# Patient Record
Sex: MALE | Race: WHITE | NOT HISPANIC OR LATINO | Employment: OTHER | URBAN - METROPOLITAN AREA
[De-identification: names, ages, dates, MRNs, and addresses within clinical notes are randomized per-mention and may not be internally consistent; named-entity substitution may affect disease eponyms.]

---

## 2017-06-27 ENCOUNTER — TRANSFERRED RECORDS (OUTPATIENT)
Dept: HEALTH INFORMATION MANAGEMENT | Facility: CLINIC | Age: 82
End: 2017-06-27

## 2017-07-11 ENCOUNTER — TRANSFERRED RECORDS (OUTPATIENT)
Dept: HEALTH INFORMATION MANAGEMENT | Facility: CLINIC | Age: 82
End: 2017-07-11

## 2017-08-31 ENCOUNTER — TRANSFERRED RECORDS (OUTPATIENT)
Dept: HEALTH INFORMATION MANAGEMENT | Facility: CLINIC | Age: 82
End: 2017-08-31

## 2017-09-10 ENCOUNTER — HOME CARE/HOSPICE - HEALTHEAST (OUTPATIENT)
Dept: HOME HEALTH SERVICES | Facility: HOME HEALTH | Age: 82
End: 2017-09-10

## 2017-09-12 ENCOUNTER — PRE VISIT (OUTPATIENT)
Dept: ORTHOPEDICS | Facility: CLINIC | Age: 82
End: 2017-09-12

## 2017-09-12 NOTE — TELEPHONE ENCOUNTER
1.  Date/reason for appt:  9/15/17   DDD/Spinal Stenosis/Tingling Hips to Legs    2.  Referring provider:  Self    3.  Call to patient (Yes / No - short description):  No tranferred from [facility]    Previously followed by Dr [facility]    [facility]    [facility]    No surgery; Injections, PT and imaging  Emailed BONNIE forms to [email or web address]

## 2017-09-13 NOTE — TELEPHONE ENCOUNTER
Received signed BONNIE forms and faxed with cover sheets to [facility], [facility] and [facility] for records/imaging asap

## 2017-09-14 ENCOUNTER — HOME CARE/HOSPICE - HEALTHEAST (OUTPATIENT)
Dept: HOME HEALTH SERVICES | Facility: HOME HEALTH | Age: 82
End: 2017-09-14

## 2017-09-14 NOTE — TELEPHONE ENCOUNTER
Records received from [facility].   Included  Office notes: 2017, 2012, 2011  Radiology reports: MRI right hip 6/27/17(duplicate)  MRI right femur 8/31/17(duplicate)  MRI lumbar 8/31/17(duplicate)  Right hip injection 7/11/17(duplicate)  MRI lumbar 12/8/11- [facility]   Injections: epidural steroid injection 12/30/11- [facility] [place]    Missing/needed records: xray's, [facility] with Dr[facility] Chepe Starr

## 2017-09-14 NOTE — TELEPHONE ENCOUNTER
Radiology reports received from [facility]. Notified Dank[facility] to pull images.   MRI lumbar 12/8/11, 8/31/17  Injection right hip 7/11/17  MRI right hip 6/27/17  MRI right thigh 8/31/17

## 2017-09-14 NOTE — PROGRESS NOTES
Spine Surgery Consultation    REFERRING PHYSICIAN: Referred Self   PRIMARY CARE PHYSICIAN: Abdulkadir Starr           Chief Complaint:   Back Pain (low back pain radiating to right lower extremity)      History of Present Illness:  Symptom Profile Including: location of symptoms, onset, severity, exacerbating/alleviating factors, previous treatments:        Hany Santillan is a 89 year old male who  presents for evaluation of bilateral claudicatory leg pain, right worse than left, which is worse walking around, somewhat improvement seated although he does get symptoms when seated. It radiates down the right lateral leg and an primarily an L5 distribution. He was diagnosed with severe stenosis and has seen multiple surgeons over the last several years. Other doctors have talked with him about decompression and fusion type operations but thought that he was too old and 2 comorbid Surgery. Thus he is kind of been limping along with this. He is previously had lumbar epidurals, most recently a right sided L5-S1 transforaminal injection which was done just a week ago. This most recent injection had no immediate relief. They do think over the last day or 2 he might have been feeling a little bit better. He's also tried Tylenol anti-inflammatories he's been through multiple rounds of physical therapy. He has to bend forward when he walks and he uses a wheeled walker.         Past Medical History:     Past Medical History:   Diagnosis Date     Arthritis      Cancer (H) 2010    Prostate cancer, s/p brachy implant 5-2010 Dr. Li/ [place]     Diabetes (H)      Eczema      Hypertension      Kidney disease      Lupus (H)      Psoriasis      Thyroid disease      Uncomplicated asthma             Past Surgical History:     Past Surgical History:   Procedure Laterality Date     APPENDECTOMY  1936     C STOMACH SURGERY PROCEDURE UNLISTED       CARDIAC SURGERY  1999    Left fem pop     ENT SURGERY  1933    Tonsillectomy     HC  "VASCULAR SURGERY PROCEDURE UNLIST       KNEE SURGERY       NECK SURGERY       ORTHOPEDIC SURGERY      right knee work in 1950, followed by replacement 2006            Social History:     Social History   Substance Use Topics     Smoking status: Former Smoker     Packs/day: 1.00     Years: 40.00     Types: Cigarettes     Smokeless tobacco: Never Used     Alcohol use No            Family History:     Family History   Problem Relation Age of Onset     CANCER Daughter 43     pancreatic     CANCER Daughter      DIABETES Mother      Hyperlipidemia Mother      Hypertension Brother      Coronary Artery Disease Brother             Allergies:     Allergies   Allergen Reactions     Ace Inhibitors      Penicillins             Medications:     Current Outpatient Prescriptions   Medication     gabapentin (NEURONTIN) 100 MG capsule     Methocarbamol (ROBAXIN PO)     lidocaine (LIDODERM) 5 % Patch     capsaicin (ZOSTRIX) 0.025 % CREA cream     ACETAMINOPHEN PO     polyethylene glycol (MIRALAX/GLYCOLAX) powder     niacin (NIASPAN) 1000 MG CR tablet     candesartan (ATACAND) 16 MG tablet     Warfarin Sodium (COUMADIN PO)     glimepiride (AMARYL) 2 MG tablet     Cholecalciferol (VITAMIN D3 PO)     calcium-vitamin D (CALTRATE) 600-400 MG-UNIT per tablet     Coenzyme Q10 (COQ10 PO)     ranitidine (ZANTAC) 150 MG tablet     aspirin 81 MG tablet     febuxostat (ULORIC) 40 MG TABS     No current facility-administered medications for this visit.              Review of Systems:     A 10 point ROS was performed and reviewed. Specific responses to these questions are noted at the end of the document.         Physical Exam:   Vitals: Ht 1.651 m (5' 5\")  Wt 73.3 kg (161 lb 9.6 oz)  BMI 26.89 kg/m2  Constitutional: awake, alert, cooperative, no apparent distress, appears stated age.    Eyes: The sclera are white.  Ears, Nose, Throat: The trachea is midline.  Psychiatric: The patient has a normal affect.  Respiratory: breathing " non-labored  Cardiovascular: The extremities are warm and perfused.  Skin: no obvious rashes or lesions.  Musculoskeletal, Neurologic, and Spine:   The bilateral legs are examined. During the time of my examination he is having severe radicular symptoms and less pain somewhat limits his ability to comply. He is 4-5 on the right hip flexor, 5 out of 5 in the left. 5 out of 5 in the bilateral hamstrings. 5 out of 5 in bilateral quadriceps and tibialis anterior. 3/5 and right extensor hallux longus and 4-5 and the left. Peroneal's are 5 out of 5 bilateral. He has some hyperesthesia in the right L5 distribution. Mildly positive right straight leg raise. His bilateral hips are rigid to the log roll but do not reproduce his symptoms.    He stands with a positive forward sagittal balance leaning over his walker.         Imaging:   We ordered and independently reviewed new radiographs at this clinic visit. The results were discussed with the patient.  Findings include:    8/31/2017 Lumbar MRI: Severe multi-level stenosis, worst at L3-4, L4-5, and L5-S1 where there is both severe central and lateral recess stenosis.   Severe multi-level foraminal stenosis, particularly at on the right side at 3-4, 4-5, and 5-1.  I did compare this against his 12/8/11 MRI and both the central and foraminal stenosis is worst from L3-S1 as compared to these prior films.    6/27/2017:  MRI of the R Hip, does not appear to show severe OA.    7/11/2017: Hip injection with appropriately placed injection.    9/8/17: R L5-S1 TFESI with appropriately placed injection    New AP lateral flexion-extension lumbar radiographs ordered and reviewed today. These show severe spondylosis with a degenerative scoliosis and asymmetric disc space collapse with lateral listhesis at multiple levels.           Assessment and Plan:   Assessment:  89 year old male with degenerative scoliosis and claudicatory right leg pain worse than left leg pain.     Plan:  1. We had  a long discussion with the patient risks benefits and options. He does have disease across multiple levels, but I think L4 5 is the worst by far. This also does correspond with his clinical symptoms. Thus if he were to have a surgery I would recommend a 4-5 decompression.  2. Given his age and comorbidities I do not think he should undergo a multilevel fusion surgery.  3. I think the technical aspects of an L4 5 decompression are very feasible and we discussed the risks of this including small risk of infection and small risk of dural tear and small risk of wound healing problems.  4. I think the major risks for him are the risk that this would not produce complete symptom relief given that he has disease across multiple levels. There is also substantial anesthetic risk given his age, including the risk of stroke heart attack death and postoperative delirium or confusion.  5. His family is very concerned about postoperative delirium because he has a brother who had a surgery and subsequently had significant cognitive decline  6. I'm going to arrange a visit for him with the anesthesia service so that he can learn more about the anesthetic risks and postoperative delirium.  7. I will try to see him back in clinic on the same day to answer any further questions that could arise about surgery.  8. In the meantime the family has a visit scheduled on Monday with a pain physician and I think it is reasonable to seek a second opinion about other forms of nonoperative care.      Respectfully,  Declan Garcias MD  Spine Surgery  [facility]      Answers for HPI/ROS submitted by the patient on 9/15/2017   General Symptoms: Yes  Skin Symptoms: No  HENT Symptoms: Yes  EYE SYMPTOMS: Yes  HEART SYMPTOMS: No  LUNG SYMPTOMS: No  INTESTINAL SYMPTOMS: No  URINARY SYMPTOMS: No  REPRODUCTIVE SYMPTOMS: No  SKELETAL SYMPTOMS: Yes  BLOOD SYMPTOMS: Yes  NERVOUS SYSTEM SYMPTOMS: Yes  MENTAL HEALTH SYMPTOMS: No  Fever:  No  Loss of appetite: No  Weight loss: No  Weight gain: No  Fatigue: Yes  Night sweats: No  Chills: Yes  Increased stress: Yes  Excessive hunger: No  Excessive thirst: No  Feeling hot or cold when others believe the temperature is normal: Yes  Loss of height: Yes  Post-operative complications: No  Surgical site pain: No  Hallucinations: No  Change in or Loss of Energy: Yes  Hyperactivity: No  Confusion: No  Ear pain: No  Ear discharge: No  Hearing loss: No  Tinnitus: No  Nosebleeds: No  Congestion: No  Sinus pain: No  Trouble swallowing: No   Voice hoarseness: Yes  Mouth sores: No  Sore throat: No  Tooth pain: No  Gum tenderness: No  Bleeding gums: No  Change in taste: No  Change in sense of smell: No  Dry mouth: Yes  Hearing aid used: Yes  Neck lump: No  Eye pain: No  Vision loss: No  Dry eyes: Yes  Watery eyes: Yes  Eye bulging: No  Double vision: No  Flashing of lights: No  Spots: No  Floaters: Yes  Redness: No  Crossed eyes: No  Tunnel Vision: No  Yellowing of eyes: No  Eye irritation: No  Back pain: Yes  Muscle aches: Yes  Neck pain: Yes  Swollen joints: No  Joint pain: Yes  Bone pain: Yes  Muscle cramps: Yes  Muscle weakness: Yes  Joint stiffness: Yes  Bone fracture: No  Anemia: No  Swollen glands: No  Easy bleeding or bruising: Yes  Edema or swelling: No  Trouble with coordination: Yes  Dizziness or trouble with balance: Yes  Fainting or black-out spells: No  Memory loss: No  Headache: No  Seizures: No  Speech problems: No  Tingling: Yes  Tremor: No  Weakness: Yes  Difficulty walking: Yes  Paralysis: No  Numbness: Yes

## 2017-09-15 ENCOUNTER — OFFICE VISIT (OUTPATIENT)
Dept: ORTHOPEDICS | Facility: CLINIC | Age: 82
End: 2017-09-15

## 2017-09-15 VITALS — BODY MASS INDEX: 26.92 KG/M2 | WEIGHT: 161.6 LBS | HEIGHT: 65 IN

## 2017-09-15 DIAGNOSIS — M79.18 BUTTOCK PAIN: Primary | ICD-10-CM

## 2017-09-15 DIAGNOSIS — M48.062 SPINAL STENOSIS OF LUMBAR REGION WITH NEUROGENIC CLAUDICATION: ICD-10-CM

## 2017-09-15 RX ORDER — CAPSAICIN 0.025 %
CREAM (GRAM) TOPICAL 3 TIMES DAILY PRN
COMMUNITY
End: 2023-01-01

## 2017-09-15 RX ORDER — POLYETHYLENE GLYCOL 3350 17 G/17G
1 POWDER, FOR SOLUTION ORAL DAILY PRN
COMMUNITY
End: 2023-01-01

## 2017-09-15 RX ORDER — ACETAMINOPHEN 500 MG
500 TABLET ORAL EVERY 8 HOURS PRN
Status: ON HOLD | COMMUNITY
End: 2023-01-01

## 2017-09-15 RX ORDER — GABAPENTIN 100 MG/1
300 CAPSULE ORAL AT BEDTIME
COMMUNITY
Start: 2017-09-10 | End: 2023-01-01

## 2017-09-15 RX ORDER — LIDOCAINE 50 MG/G
1 PATCH TOPICAL EVERY 24 HOURS
COMMUNITY
End: 2023-01-01

## 2017-09-15 ASSESSMENT — ENCOUNTER SYMPTOMS
BACK PAIN: 1
NIGHT SWEATS: 0
STIFFNESS: 1
JOINT SWELLING: 0
DOUBLE VISION: 0
WEIGHT GAIN: 0
TASTE DISTURBANCE: 0
SEIZURES: 0
BRUISES/BLEEDS EASILY: 1
SPEECH CHANGE: 0
MUSCLE WEAKNESS: 1
DIZZINESS: 1
TROUBLE SWALLOWING: 0
LOSS OF CONSCIOUSNESS: 0
DECREASED APPETITE: 0
FEVER: 0
SORE THROAT: 0
ALTERED TEMPERATURE REGULATION: 1
SMELL DISTURBANCE: 0
EYE REDNESS: 0
HOARSE VOICE: 1
SINUS PAIN: 0
HALLUCINATIONS: 0
EYE IRRITATION: 0
NUMBNESS: 1
CHILLS: 1
EYE WATERING: 1
PARALYSIS: 0
POLYPHAGIA: 0
MEMORY LOSS: 0
SINUS CONGESTION: 0
NECK PAIN: 1
EYE PAIN: 0
FATIGUE: 1
WEIGHT LOSS: 0
HEADACHES: 0
DISTURBANCES IN COORDINATION: 1
NECK MASS: 0
MUSCLE CRAMPS: 1
WEAKNESS: 1
SWOLLEN GLANDS: 0
POLYDIPSIA: 0
MYALGIAS: 1
ARTHRALGIAS: 1
TREMORS: 0
INCREASED ENERGY: 1
TINGLING: 1

## 2017-09-15 NOTE — LETTER
9/15/2017       RE: Hany Santillan  [address]  [place] [address]     Dear Colleague,    Thank you for referring your patient, Hany Santillan, to the [facility] at [facility]. Please see a copy of my visit note below.    Spine Surgery Consultation    REFERRING PHYSICIAN: Referred Self   PRIMARY CARE PHYSICIAN: Abdulkadir Starr           Chief Complaint:   Back Pain (low back pain radiating to right lower extremity)      History of Present Illness:  Symptom Profile Including: location of symptoms, onset, severity, exacerbating/alleviating factors, previous treatments:        Hany Santillan is a 89 year old male who  presents for evaluation of bilateral claudicatory leg pain, right worse than left, which is worse walking around, somewhat improvement seated although he does get symptoms when seated. It radiates down the right lateral leg and an primarily an L5 distribution. He was diagnosed with severe stenosis and has seen multiple surgeons over the last several years. Other doctors have talked with him about decompression and fusion type operations but thought that he was too old and 2 comorbid Surgery. Thus he is kind of been limping along with this. He is previously had lumbar epidurals, most recently a right sided L5-S1 transforaminal injection which was done just a week ago. This most recent injection had no immediate relief. They do think over the last day or 2 he might have been feeling a little bit better. He's also tried Tylenol anti-inflammatories he's been through multiple rounds of physical therapy. He has to bend forward when he walks and he uses a wheeled walker.         Past Medical History:     Past Medical History:   Diagnosis Date     Arthritis      Cancer (H) 2010    Prostate cancer, s/p brachy implant 5-2010 Dr. Li/ [place]     Diabetes (H)      Eczema      Hypertension      Kidney disease      Lupus (H)      Psoriasis       "Thyroid disease      Uncomplicated asthma             Past Surgical History:     Past Surgical History:   Procedure Laterality Date     APPENDECTOMY  1936     C STOMACH SURGERY PROCEDURE UNLISTED       CARDIAC SURGERY  1999    Left fem pop     ENT SURGERY  1933    Tonsillectomy     HC VASCULAR SURGERY PROCEDURE UNLIST       KNEE SURGERY       NECK SURGERY       ORTHOPEDIC SURGERY      right knee work in 1950, followed by replacement 2006            Social History:     Social History   Substance Use Topics     Smoking status: Former Smoker     Packs/day: 1.00     Years: 40.00     Types: Cigarettes     Smokeless tobacco: Never Used     Alcohol use No            Family History:     Family History   Problem Relation Age of Onset     CANCER Daughter 43     pancreatic     CANCER Daughter      DIABETES Mother      Hyperlipidemia Mother      Hypertension Brother      Coronary Artery Disease Brother             Allergies:     Allergies   Allergen Reactions     Ace Inhibitors      Penicillins             Medications:     Current Outpatient Prescriptions   Medication     gabapentin (NEURONTIN) 100 MG capsule     Methocarbamol (ROBAXIN PO)     lidocaine (LIDODERM) 5 % Patch     capsaicin (ZOSTRIX) 0.025 % CREA cream     ACETAMINOPHEN PO     polyethylene glycol (MIRALAX/GLYCOLAX) powder     niacin (NIASPAN) 1000 MG CR tablet     candesartan (ATACAND) 16 MG tablet     Warfarin Sodium (COUMADIN PO)     glimepiride (AMARYL) 2 MG tablet     Cholecalciferol (VITAMIN D3 PO)     calcium-vitamin D (CALTRATE) 600-400 MG-UNIT per tablet     Coenzyme Q10 (COQ10 PO)     ranitidine (ZANTAC) 150 MG tablet     aspirin 81 MG tablet     febuxostat (ULORIC) 40 MG TABS     No current facility-administered medications for this visit.              Review of Systems:     A 10 point ROS was performed and reviewed. Specific responses to these questions are noted at the end of the document.         Physical Exam:   Vitals: Ht 1.651 m (5' 5\")  Wt 73.3 " kg (161 lb 9.6 oz)  BMI 26.89 kg/m2  Constitutional: awake, alert, cooperative, no apparent distress, appears stated age.    Eyes: The sclera are white.  Ears, Nose, Throat: The trachea is midline.  Psychiatric: The patient has a normal affect.  Respiratory: breathing non-labored  Cardiovascular: The extremities are warm and perfused.  Skin: no obvious rashes or lesions.  Musculoskeletal, Neurologic, and Spine:   The bilateral legs are examined. During the time of my examination he is having severe radicular symptoms and less pain somewhat limits his ability to comply. He is 4-5 on the right hip flexor, 5 out of 5 in the left. 5 out of 5 in the bilateral hamstrings. 5 out of 5 in bilateral quadriceps and tibialis anterior. 3/5 and right extensor hallux longus and 4-5 and the left. Peroneal's are 5 out of 5 bilateral. He has some hyperesthesia in the right L5 distribution. Mildly positive right straight leg raise. His bilateral hips are rigid to the log roll but do not reproduce his symptoms.    He stands with a positive forward sagittal balance leaning over his walker.         Imaging:   We ordered and independently reviewed new radiographs at this clinic visit. The results were discussed with the patient.  Findings include:    8/31/2017 Lumbar MRI: Severe multi-level stenosis, worst at L3-4, L4-5, and L5-S1 where there is both severe central and lateral recess stenosis.   Severe multi-level foraminal stenosis, particularly at on the right side at 3-4, 4-5, and 5-1.  I did compare this against his 12/8/11 MRI and both the central and foraminal stenosis is worst from L3-S1 as compared to these prior films.    6/27/2017:  MRI of the R Hip, does not appear to show severe OA.    7/11/2017: Hip injection with appropriately placed injection.    9/8/17: R L5-S1 TFESI with appropriately placed injection    New AP lateral flexion-extension lumbar radiographs ordered and reviewed today. These show severe spondylosis with a  degenerative scoliosis and asymmetric disc space collapse with lateral listhesis at multiple levels.           Assessment and Plan:   Assessment:  89 year old male with degenerative scoliosis and claudicatory right leg pain worse than left leg pain.     Plan:  1. We had a long discussion with the patient risks benefits and options. He does have disease across multiple levels, but I think L4 5 is the worst by far. This also does correspond with his clinical symptoms. Thus if he were to have a surgery I would recommend a 4-5 decompression.  2. Given his age and comorbidities I do not think he should undergo a multilevel fusion surgery.  3. I think the technical aspects of an L4 5 decompression are very feasible and we discussed the risks of this including small risk of infection and small risk of dural tear and small risk of wound healing problems.  4. I think the major risks for him are the risk that this would not produce complete symptom relief given that he has disease across multiple levels. There is also substantial anesthetic risk given his age, including the risk of stroke heart attack death and postoperative delirium or confusion.  5. His family is very concerned about postoperative delirium because he has a brother who had a surgery and subsequently had significant cognitive decline  6. I'm going to arrange a visit for him with the anesthesia service so that he can learn more about the anesthetic risks and postoperative delirium.  7. I will try to see him back in clinic on the same day to answer any further questions that could arise about surgery.  8. In the meantime the family has a visit scheduled on Monday with a pain physician and I think it is reasonable to seek a second opinion about other forms of nonoperative care.      Respectfully,  Declan Garcias MD  Spine Surgery  [facility]

## 2017-09-15 NOTE — MR AVS SNAPSHOT
After Visit Summary   9/15/2017    Hany Santillan    MRN: [id number]           Patient Information     Date Of Birth          7/29/1928        Visit Information        Provider Department      9/15/2017 11:00 AM Declan Garcias MD [facility]        Today's Diagnoses     Buttock pain    -  1    Spinal stenosis of lumbar region with neurogenic claudication           Follow-ups after your visit        Additional Services     PAC Visit Referral (For [facility] Only)       Does this visit require an Anesthesia consult?  Yes - Evaluate for medical necessity related to one of the following conditions:  Hx of femoral bypass in 1995,  Personal or family history of severe postop delirium    H&P done by:  N/A      Please be aware that coverage of these services is subject to the terms and limitations of your health insurance plan.  Call member services at your health plan with any benefit or coverage questions.      Please bring the following to your appointment:  >>   Any x-rays, CTs or MRIs which have been performed.  Contact the facility where they were done to arrange for  prior to your scheduled appointment.  Any new CT, MRI or other procedures ordered by your specialist must be performed at a [place] facility or coordinated by your clinic's referral office.    >>   List of current medications  >>   This referral request   >>   Any documents/labs given to you for this referral                  Who to contact     Please call your clinic at [phone] to:    Ask questions about your health    Make or cancel appointments    Discuss your medicines    Learn about your test results    Speak to your doctor   If you have compliments or concerns about an experience at your clinic, or if you wish to file a complaint, please contact [facility] Patient Relations at [phone] or email us at [email or web address]         Additional Information  "About Your Visit        [employer] Information     Lingu[employer]Next is an electronic gateway that provides easy, online access to your medical records. With LinguaNext, you can request a clinic appointment, read your test results, renew a prescription or communicate with your care team.     To sign up for LinguaNext visit the website at [email or web address]   You will be asked to enter the access code listed below, as well as some personal information. Please follow the directions to create your username and password.     Your access code is: [email or web address]  Expires: 2017  6:30 AM     Your access code will  in 90 days. If you need help or a new code, please contact your [facility] or call [phone] for assistance.        Care EveryWhere ID     This is your Care EveryWhere ID. This could be used by other organizations to access your [facility] medical records  [id number]        Your Vitals Were     Height BMI (Body Mass Index)                1.651 m (5' 5\") 26.89 kg/m2           Blood Pressure from Last 3 Encounters:   10/15/15 152/83   14 148/70   10/21/14 (!) 138/91    Weight from Last 3 Encounters:   09/15/17 73.3 kg (161 lb 9.6 oz)   14 79.5 kg (175 lb 3.2 oz)   10/21/14 78.3 kg (172 lb 9.6 oz)              We Performed the Following     PAC Visit Referral (For [facility] Only)        Primary Care Provider Office Phone # Fax #    Abdulkadir Matty [phone]       [facility] [id number]  [place] [facility] [address]        Equal Access to Services     TAMMY RATLIFF : Hadii jose king Sokarissa, waaxda luqadaha, qaybta kaalmada lukas coelho . So [facility] [phone].    ATENCIÓN: Si habla español, tiene a edmonds disposición servicios gratuitos de asistencia lingüística. Llame al [phone].    We comply with applicable federal civil rights laws and [place] laws. We do not discriminate on the basis of race, " color, national origin, age, disability sex, sexual orientation or gender identity.            Thank you!     Thank you for choosing [facility]  for your care. Our goal is always to provide you with excellent care. Hearing back from our patients is one way we can continue to improve our services. Please take a few minutes to complete the written survey that you may receive in the mail after your visit with us. Thank you!             Your Updated Medication List - Protect others around you: Learn how to safely use, store and throw away your medicines at www.disposemymeds.org.          This list is accurate as of: 9/15/17  3:18 PM.  Always use your most recent med list.                   Brand Name Dispense Instructions for use Diagnosis    ACETAMINOPHEN PO      Take 650 mg by mouth every 8 hours as needed for pain        aspirin 81 MG tablet      Take 81 mg by mouth daily        ATACAND 16 MG tablet   Generic drug:  candesartan      Take 16 mg by mouth daily        calcium-vitamin D 600-400 MG-UNIT per tablet    CALTRATE     Take 1 tablet by mouth daily        capsaicin 0.025 % Crea cream    ZOSTRIX     Apply topically 3 times daily as needed        COQ10 PO      Take 300 mg by mouth daily        COUMADIN PO      Takes 5 mg 2 days/week, and takes 2 mg 5 days/week.        gabapentin 100 MG capsule    NEURONTIN     Take 300 mg by mouth At Bedtime        glimepiride 2 MG tablet    AMARYL     Take 2 mg by mouth every morning (before breakfast)        lidocaine 5 % Patch    LIDODERM     Place 1 patch onto the skin every 24 hours        NIASPAN 1000 MG CR tablet   Generic drug:  niacin      Take 1,000 mg by mouth At Bedtime        polyethylene glycol powder    MIRALAX/GLYCOLAX     Take 1 capful by mouth daily as needed for constipation        ROBAXIN PO      Take 500 mg by mouth 3 times daily        ULORIC 40 MG Tabs tablet   Generic drug:  febuxostat      Take 40 mg by mouth daily        VITAMIN D3 PO       Take 2,000 Units by mouth daily        ZANTAC 150 MG tablet   Generic drug:  ranitidine      Take 150 mg by mouth daily

## 2017-09-15 NOTE — NURSING NOTE
"Reason For Visit:   Chief Complaint   Patient presents with     Back Pain     low back pain radiating to right lower extremity       Primary MD: Abdulkadir Starr  Ref. MD: self    ?  No  Currently working? No.  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  Smoker: No      Ht 1.651 m (5' 5\")  Wt 73.3 kg (161 lb 9.6 oz)  BMI 26.89 kg/m2    Pain Assessment  Patient Currently in Pain: Yes  0-10 Pain Scale: 5  Primary Pain Location: Leg  Pain Orientation: Right  Other Pain Locations: low back  Pain Descriptors: Aching  Alleviating Factors: Other (comment), Rest (walking, massage)  Aggravating Factors: Bending    Oswestry (MANJU) Questionnaire    OSWESTRY DISABILITY INDEX 9/15/2017   Section 1 - Pain intensity 5   Section 2 - Personal care (washing, dressing, etc.)  3   Section 3 - Lifting 5   Section 4 - Walking 4   Section 5 - Sitting 4   Section 6 - Standing 4   Section 7 - Sleeping 1   Section 8 - Sex life (if applicable) -1   Section 9 - Social life 4   Section 10 - Traveling 4   Count 9   Sum 34   Oswestry Score (%) 75.56   SECTION 1-PAIN INTENSITY The pain is the worst imaginable at the moment.   SECTION 2-PERSONAL CARE (WASHING,DRESSING,ETC.) I need some help but manage most of my personal care.   SECTION 3-LIFTING I cannot lift or carry anything at all.   SECTION 4-WALKING I can only walk using a cane or crutches.   SECTION 5-SITTING Pain prevents me from sitting for more than 10 minutes.   SECTION 6-STANDING Pain prevents me from standing for more than 10 minutes.   SECTION 7-SLEEPING My sleep is occasionally interrupted by pain.   SECTION 8-SEX LIFE (IF APPLICABLE) Not answered/NA   SECTION 9-SOCIAL LIFE Pain has restricted my social life to home.   SECTION 10-TRAVELING Pain restricts me to short necessary trips of under 30 minutes.   Oswestry Disability Index: Count 9   MANJU: Total Score = SUM (total for " answered questions) 34   Computed Oswestry Score 75.55 (%)   Some recent data might be hidden        Visual Analog Pain Scale  Back Pain Scale 0-10: 10  Right leg pain: 7.5  Left leg pain: 0    Promis 10 Assessment    PROMIS 10 9/15/2017   In general, would you say your health is: Good   In general, would you say your quality of life is: Good   In general, how would you rate your physical health? Fair   In general, how would you rate your mental health, including your mood and your ability to think? Excellent   In general, how would you rate your satisfaction with your social activities and relationships? Good   In general, please rate how well you carry out your usual social activities and roles Very good   To what extent are you able to carry out your everyday physical activities such as walking, climbing stairs, carrying groceries, or moving a chair? A little   How often have you been bothered by emotional problems such as feeling anxious, depressed or irritable? Sometimes   How would you rate your fatigue on average? Moderate   How would you rate your pain on average?   0 = No Pain  to  10 = Worst Imaginable Pain 10   Global Physical Health Score : Raw Score 8 (SUM : G03 - G06 - G07 - G08)   Global Mentall Health Score : Raw Score 14 (SUM : G02 - G04 - G05 - G10)   Total (Physical + Mental Health Score) 22   In general, would you say your health is: 3   In general, would you say your quality of life is: 3   In general, how would you rate your physical health? 2   In general, how would you rate your mental health, including your mood and your ability to think? 5   In general, how would you rate your satisfaction with your social activities and relationships? 3   In general, please rate how well you carry out your usual social activities and roles. (This includes activities at home, at work and in your community, and responsibilities as a parent, child, spouse, employee, friend, etc.) 4   To what extent are you  able to carry out your everyday physical activities such as walking, climbing stairs, carrying groceries, or moving a chair? 2   In the past 7 days, how often have you been bothered by emotional problems such as feeling anxious, depressed, or irritable? 3   In the past 7 days, how would you rate your fatigue on average? 3   In the past 7 days, how would you rate your pain on average, where 0 means no pain, and 10 means worst imaginable pain? 10   Global Mental Health Score 14   Global Physical Health Score 8   PROMIS TOTAL - SUBSCORES 22   Pain question re-calculation - no clinical value 1   Some recent data might be hidden        Anna Snow LPN

## 2017-09-21 ENCOUNTER — HOME CARE/HOSPICE - HEALTHEAST (OUTPATIENT)
Dept: HOME HEALTH SERVICES | Facility: HOME HEALTH | Age: 82
End: 2017-09-21

## 2017-09-23 ENCOUNTER — HOME CARE/HOSPICE - HEALTHEAST (OUTPATIENT)
Dept: HOME HEALTH SERVICES | Facility: HOME HEALTH | Age: 82
End: 2017-09-23

## 2017-09-25 ENCOUNTER — HOME CARE/HOSPICE - HEALTHEAST (OUTPATIENT)
Dept: HOME HEALTH SERVICES | Facility: HOME HEALTH | Age: 82
End: 2017-09-25

## 2017-09-28 ENCOUNTER — HOME CARE/HOSPICE - HEALTHEAST (OUTPATIENT)
Dept: HOME HEALTH SERVICES | Facility: HOME HEALTH | Age: 82
End: 2017-09-28

## 2021-05-25 ENCOUNTER — RECORDS - HEALTHEAST (OUTPATIENT)
Dept: ADMINISTRATIVE | Facility: CLINIC | Age: 86
End: 2021-05-25

## 2021-05-28 ENCOUNTER — RECORDS - HEALTHEAST (OUTPATIENT)
Dept: ADMINISTRATIVE | Facility: CLINIC | Age: 86
End: 2021-05-28

## 2021-05-30 ENCOUNTER — RECORDS - HEALTHEAST (OUTPATIENT)
Dept: ADMINISTRATIVE | Facility: CLINIC | Age: 86
End: 2021-05-30

## 2021-06-02 ENCOUNTER — RECORDS - HEALTHEAST (OUTPATIENT)
Dept: ADMINISTRATIVE | Facility: CLINIC | Age: 86
End: 2021-06-02

## 2021-06-30 ENCOUNTER — RECORDS - HEALTHEAST (OUTPATIENT)
Dept: LAB | Facility: CLINIC | Age: 86
End: 2021-06-30

## 2021-07-01 LAB — INR PPP: 2.24 (ref 0.9–1.1)

## 2021-07-05 ENCOUNTER — RECORDS - HEALTHEAST (OUTPATIENT)
Dept: LAB | Facility: CLINIC | Age: 86
End: 2021-07-05

## 2021-07-05 LAB
ANION GAP SERPL CALCULATED.3IONS-SCNC: 15 MMOL/L (ref 5–18)
BUN SERPL-MCNC: 22 MG/DL (ref 8–28)
CALCIUM SERPL-MCNC: 9.7 MG/DL (ref 8.5–10.5)
CHLORIDE BLD-SCNC: 106 MMOL/L (ref 98–107)
CO2 SERPL-SCNC: 18 MMOL/L (ref 22–31)
CREAT SERPL-MCNC: 1.5 MG/DL (ref 0.7–1.3)
GFR SERPL CREATININE-BSD FRML MDRD: 44 ML/MIN/1.73M2
GLUCOSE BLD-MCNC: 103 MG/DL (ref 70–125)
INR PPP: 3.04 (ref 0.9–1.1)
POTASSIUM BLD-SCNC: 3.4 MMOL/L (ref 3.5–5)
SODIUM SERPL-SCNC: 139 MMOL/L (ref 136–145)

## 2021-07-06 ENCOUNTER — RECORDS - HEALTHEAST (OUTPATIENT)
Dept: LAB | Facility: CLINIC | Age: 86
End: 2021-07-06

## 2021-07-07 LAB — INR PPP: 2.09 (ref 0.9–1.1)

## 2021-07-10 ENCOUNTER — HOSPITAL ENCOUNTER (EMERGENCY)
Dept: EMERGENCY MEDICINE | Facility: HOSPITAL | Age: 86
Discharge: HOME | End: 2021-07-10
Attending: EMERGENCY MEDICINE
Payer: MEDICARE
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
## 2021-07-10 LAB
ANION GAP SERPL CALCULATED.3IONS-SCNC: 12 MMOL/L (ref 5–18)
BASOPHILS # BLD AUTO: 0.1 THOU/UL (ref 0–0.2)
BASOPHILS NFR BLD AUTO: 1 % (ref 0–2)
BUN SERPL-MCNC: 28 MG/DL (ref 8–28)
CALCIUM SERPL-MCNC: 10.3 MG/DL (ref 8.5–10.5)
CHLORIDE BLD-SCNC: 107 MMOL/L (ref 98–107)
CO2 SERPL-SCNC: 21 MMOL/L (ref 22–31)
CREAT SERPL-MCNC: 1.95 MG/DL (ref 0.7–1.3)
EOSINOPHIL # BLD AUTO: 0 THOU/UL (ref 0–0.4)
EOSINOPHIL NFR BLD AUTO: 0 % (ref 0–6)
ERYTHROCYTE [DISTWIDTH] IN BLOOD BY AUTOMATED COUNT: 15.6 % (ref 11–14.5)
GFR SERPL CREATININE-BSD FRML MDRD: 32 ML/MIN/1.73M2
GLUCOSE BLD-MCNC: 150 MG/DL (ref 70–125)
HCT VFR BLD AUTO: 33 % (ref 40–54)
HGB BLD-MCNC: 10.2 G/DL (ref 14–18)
IMM GRANULOCYTES # BLD: 0.1 THOU/UL
IMM GRANULOCYTES NFR BLD: 1 %
INR PPP: 2.02 (ref 0.9–1.1)
LYMPHOCYTES # BLD AUTO: 1.9 THOU/UL (ref 0.8–4.4)
LYMPHOCYTES NFR BLD AUTO: 20 % (ref 20–40)
MCH RBC QN AUTO: 29 PG (ref 27–34)
MCHC RBC AUTO-ENTMCNC: 30.9 G/DL (ref 32–36)
MCV RBC AUTO: 94 FL (ref 80–100)
MONOCYTES # BLD AUTO: 0.6 THOU/UL (ref 0–0.9)
MONOCYTES NFR BLD AUTO: 7 % (ref 2–10)
NEUTROPHILS # BLD AUTO: 6.6 THOU/UL (ref 2–7.7)
NEUTROPHILS NFR BLD AUTO: 71 % (ref 50–70)
PLATELET # BLD AUTO: 265 THOU/UL (ref 140–440)
PMV BLD AUTO: 10 FL (ref 8.5–12.5)
POTASSIUM BLD-SCNC: 4 MMOL/L (ref 3.5–5)
RBC # BLD AUTO: 3.52 MILL/UL (ref 4.4–6.2)
SODIUM SERPL-SCNC: 140 MMOL/L (ref 136–145)
WBC: 9.2 THOU/UL (ref 4–11)

## 2021-07-10 NOTE — ED TRIAGE NOTES
ED Triage Notes by Debbie Monsalve RN at 7/10/2021  8:17 AM     Author: Debbie Monsalve RN Service: -- Author Type: Registered Nurse    Filed: 7/10/2021  8:19 AM Date of Service: 7/10/2021  8:17 AM Status: Signed    : Debbie Monsalve RN (Registered Nurse)       Patient presents to ED for evaluation of fall. Per medics patient at [facility] when he had an unwitnessed fall last night. Around 0500 patient found on floor, put back in bed and called family around 0600 and wanted to be seen. Patient on blood thinners, trauma alert called. Patient DNR/DNI. At facility after hip fracture. Per daughter, who was at scene normal mentation. Patient knew name/.

## 2021-07-10 NOTE — ED PROVIDER NOTES
ED Provider Notes by Юлия Fuller DO at 7/10/2021  8:24 AM     Author: Юлия Fuller DO Service: Emergency Medicine Author Type: Physician    Filed: 7/10/2021 12:20 PM Date of Service: 7/10/2021  8:24 AM Status: Signed    : Юлия Fuller DO (Physician)       EMERGENCY DEPARTMENT ENCOUNTER      NAME: Hany Santillan  AGE: 89 y.o. male  YOB: 1928  MRN: [id number]  EVALUATION DATE & TIME: 7/10/2021  8:17 AM    PCP: Abdulkadir Starr MD    ED PROVIDER: Sonia Fuller DO      Chief Complaint   Patient presents with   ? Fall         FINAL IMPRESSION:  1. Fall, initial encounter          ED COURSE & MEDICAL DECISION MAKIN:24 AM Met with patient for initial interview and exam. Discussed initial plan for care for their stay in the emergency department.  10:34 AM I updated the patient and their family.     The patient was interviewed and examined.  History in the chart was reviewed.  89 y.o. male presents to the Emergency Department for evaluation of a fall.  Approximately 2 hours between last checked on him found on the ground.  Patient has a history of falls, left hip fracture in .  Patient is oriented to self and birthdate, not to place, daughter reports this is around baseline.  No obvious signs of significant trauma on exam.  He is small abrasion to his left low back.  Is no midline tenderness.  He moves all his extremities without difficulty.  Intact appearing scar to the left hip.  He is on Coumadin for history of a DVT.  We will plan on basic labs, CT of the head and neck, x-ray of the chest and pelvis given his blunt trauma.  He denies any pain.  Initial BP read very high, however likely due to patient being moved as repeat is 140/68.  Labs are fairly unremarkable.  INR is therapeutic.  No signs or symptoms of infection.  Patient made as baseline.  CT the head without bleed, CT of cervical spine without acute abnormality.  Chest x-ray is clear, pelvic x-ray with  intact hardware from prior replacement.  At this point, I feel patient is safe to discharge back to his facility.  Discussed with family at bedside.  Ride arranged to get patient back.    At the conclusion of the encounter I discussed  the results of all of the tests and the disposition with the patient and his family.   All questions were answered.  The patient acknowledged understanding and was involved in the decision making regarding the overall care plan.      I discussed with the patient the utility, limitations and findings of the exam/interventions/studies done during this visit as well as the list of differential diagnosis and symptoms to monitor/return to ER for.  Additional verbal discharge instructions were provided.     MEDICATIONS GIVEN IN THE EMERGENCY:  Medications - No data to display    NEW PRESCRIPTIONS STARTED AT TODAY'S ER VISIT  Discharge Medication List as of 7/10/2021 11:40 AM      CONTINUE these medications which have NOT CHANGED    Details   acetaminophen (TYLENOL) 325 MG tablet Take 2 tablets (650 mg total) by mouth 3 (three) times a day., Starting 9/10/2017, Until Discontinued, OTC      allopurinol (ZYLOPRIM) 100 MG tablet Take 100 mg by mouth daily., Until Discontinued, Historical Med      aspirin 81 MG EC tablet Take 81 mg by mouth daily., Until Discontinued, Historical Med      calcium carbonate 1250 MG capsule Take 1,250 mg by mouth daily., Until Discontinued, Historical Med      calcium, as carbonate, (TUMS) 200 mg calcium (500 mg) chewable tablet Chew 1 tablet 2 (two) times a day as needed for heartburn., Until Discontinued, Historical Med      capsaicin (ZOSTRIX) 0.025 % cream Three times a day, OTC      cholecalciferol, vitamin D3, 1,000 unit tablet Take 1,000 Units by mouth daily., Until Discontinued, Historical Med      co-enzyme Q-10 50 mg capsule Take 50 mg by mouth daily., Until Discontinued, Historical Med      gabapentin (NEURONTIN) 100 MG capsule Take 200 mg by mouth at  bedtime., Starting 9/10/2017, Until Discontinued, Print      ipratropium (ATROVENT) 0.06 % nasal spray 1 spray into each nostril 2 (two) times a day. Each nostril , Until Discontinued, Historical Med      lidocaine 4 % patch Place 2 patches on the skin daily. Remove and discard patch with 12 hours or as directed by MD., Starting 9/10/2017, Until Discontinued, Print      losartan (COZAAR) 50 MG tablet Take 50 mg by mouth daily., Until Discontinued, Historical Med      methocarbamol (ROBAXIN) 500 MG tablet Take 1 tablet (500 mg total) by mouth 3 (three) times a day as needed., Starting 9/14/2017, Until Discontinued, Print      niacin 1,000 mg TbER Take 1 tablet by mouth every evening., Until Discontinued, Historical Med      olopatadine 0.7 % Drop Apply 1 drop to eye daily., Until Discontinued, Historical Med      polyethylene glycol (MIRALAX) 17 gram packet Take 1 packet (17 g total) by mouth daily as needed., Starting 9/10/2017, Until Discontinued, OTC      ranitidine (ZANTAC) 150 MG tablet Take 150 mg by mouth 2 (two) times a day., Until Discontinued, Historical Med      warfarin (COUMADIN) 3 MG tablet Take 1 tablet (3 mg total) by mouth See Admin Instructions. Take 3 mg daily until seen by primary MD on Tuesday then redose  Per primary MD, Starting 9/10/2017, Until Discontinued, No Print                =================================================================    HPI    Patient information was obtained from: Patient, daughter, and EMS    Use of : N/A        Hany Santillan is a 89 y.o. male with a pertinent history of arthritis, ASCVD, left hip fracture, type 2 diabetes mellitus, CKD, and hypertension who presents to this ED by EMS for evaluation of fall.    EMS reports that patient came across the street from [facility] and was reported that he fell last night and was found on the ground at 0500 and family contacted at 0600 before bringing him here. It was reported that there was a two hour  gap that the patient was not seen and they are unsure of when he fell. Patient had a left broken hip from a fall on June 2nd. Patient denies any other complaints at this time.     HPI limited due to age.       REVIEW OF SYSTEMS   Review of Systems   Unable to perform ROS: Age        PAST MEDICAL HISTORY:  Past Medical History:   Diagnosis Date   ? Arthritis    ? Asthma    ? DM (diabetes mellitus) (H)    ? DVT (deep venous thrombosis) (H)    ? Facial basal cell cancer    ? GERD (gastroesophageal reflux disease)    ? HTN (hypertension)    ? Kidney disease    ? Lupus (H)    ? Prostate cancer (H)    ? PVD (peripheral vascular disease) (H)    ? Thyroid disease        PAST SURGICAL HISTORY:  Past Surgical History:   Procedure Laterality Date   ? APPENDECTOMY     ? CARDIAC SURGERY     ? REPLACEMENT TOTAL KNEE Right    ? TONSILLECTOMY             CURRENT MEDICATIONS:    No current facility-administered medications on file prior to encounter.      Current Outpatient Medications on File Prior to Encounter   Medication Sig   ? acetaminophen (TYLENOL) 325 MG tablet Take 2 tablets (650 mg total) by mouth 3 (three) times a day.   ? allopurinol (ZYLOPRIM) 100 MG tablet Take 100 mg by mouth daily.   ? aspirin 81 MG EC tablet Take 81 mg by mouth daily.   ? calcium carbonate 1250 MG capsule Take 1,250 mg by mouth daily.   ? calcium, as carbonate, (TUMS) 200 mg calcium (500 mg) chewable tablet Chew 1 tablet 2 (two) times a day as needed for heartburn.   ? capsaicin (ZOSTRIX) 0.025 % cream Three times a day   ? cholecalciferol, vitamin D3, 1,000 unit tablet Take 1,000 Units by mouth daily.   ? co-enzyme Q-10 50 mg capsule Take 50 mg by mouth daily.   ? gabapentin (NEURONTIN) 100 MG capsule Take 200 mg by mouth at bedtime.   ? ipratropium (ATROVENT) 0.06 % nasal spray 1 spray into each nostril 2 (two) times a day. Each nostril    ? lidocaine 4 % patch Place 2 patches on the skin daily. Remove and discard patch with 12 hours or as  directed by MD.   ? losartan (COZAAR) 50 MG tablet Take 50 mg by mouth daily.   ? methocarbamol (ROBAXIN) 500 MG tablet Take 1 tablet (500 mg total) by mouth 3 (three) times a day as needed.   ? niacin 1,000 mg TbER Take 1 tablet by mouth every evening.   ? olopatadine 0.7 % Drop Apply 1 drop to eye daily.   ? polyethylene glycol (MIRALAX) 17 gram packet Take 1 packet (17 g total) by mouth daily as needed.   ? ranitidine (ZANTAC) 150 MG tablet Take 150 mg by mouth 2 (two) times a day.   ? warfarin (COUMADIN) 3 MG tablet Take 1 tablet (3 mg total) by mouth See Admin Instructions. Take 3 mg daily until seen by primary MD on Tuesday then redose  Per primary MD       ALLERGIES:  Allergies   Allergen Reactions   ? Ace Inhibitors Hives   ? Hydromorphone Other (See Comments)     Hallucinations seeing bugs on walls    ? Penicillins Angioedema and Swelling       FAMILY HISTORY:  History reviewed. No pertinent family history.    SOCIAL HISTORY:   Social History     Socioeconomic History   ? Marital status:      Spouse name: None   ? Number of children: None   ? Years of education: None   ? Highest education level: None   Occupational History   ? None   Social Needs   ? Financial resource strain: None   ? Food insecurity     Worry: None     Inability: None   ? Transportation needs     Medical: None     Non-medical: None   Tobacco Use   ? Smoking status: Former Smoker   Substance and Sexual Activity   ? Alcohol use: No   ? Drug use: No   ? Sexual activity: None   Lifestyle   ? Physical activity     Days per week: None     Minutes per session: None   ? Stress: None   Relationships   ? Social connections     Talks on phone: None     Gets together: None     Attends Church service: None     Active member of club or organization: None     Attends meetings of clubs or organizations: None     Relationship status: None   ? Intimate partner violence     Fear of current or ex partner: None     Emotionally abused: None      Physically abused: None     Forced sexual activity: None   Other Topics Concern   ? None   Social History Narrative   ? None         PHYSICAL EXAM      VITAL SIGNS: /70   Pulse 85   Temp 97.7  F (36.5  C) (Oral)   Resp (!) 31   Wt 155 lb (70.3 kg)   SpO2 99%   BMI 25.02 kg/m     Constitutional:  Well developed, Well nourished,   HENT:  Normocephalic, Atraumatic, Bilateral external ears normal, No Hemotympanum, Oropharynx moist, No oral exudates, Nose normal. No facial trauma  Neck:  Normal range of motion, No c-spine tenderness, Supple, No stridor.   Eyes:  PERRL, EOMI, Conjunctiva normal, No discharge, Vision normal  Respiratory:  Equal breath sounds bilaterally, No respiratory distress, No wheezing, No chest tenderness or deformity.   Cardiovascular:  Normal heart rate, Normal rhythm, No murmurs, No rubs, No gallops.   GI:  Soft, No tenderness, No gaurding, No CVA tenderness, no echymosis.   Musculoskeletal:  Neurovascularly intact distally, No edema, No tenderness, No cyanosis, No clubbing. Good range of motion in all major joints. No tenderness to palpation or major deformities noted.   Back:  No t-spine or l-spine tenderness, no step offs. Superficial abrasion to left low back.   Integument:  Warm, Dry, No erythema, No rash. Cleared up scar on left hip, scattered bruising bilateral to extremities.   Neurologic:  oriented to name and date of birth,  Not to place or year , Normal motor function, Normal sensory function, No focal deficits noted.   Psychiatric:  Affect normal, Judgment normal, Mood normal.        LAB:  All pertinent labs reviewed and interpreted.  Results for orders placed or performed during the hospital encounter of 07/10/21   Basic Metabolic Panel   Result Value Ref Range    Sodium 140 136 - 145 mmol/L    Potassium 4.0 3.5 - 5.0 mmol/L    Chloride 107 98 - 107 mmol/L    CO2 21 (L) 22 - 31 mmol/L    Anion Gap, Calculation 12 5 - 18 mmol/L    Glucose 150 (H) 70 - 125 mg/dL    Calcium  10.3 8.5 - 10.5 mg/dL    BUN 28 8 - 28 mg/dL    Creatinine 1.95 (H) 0.70 - 1.30 mg/dL    GFR MDRD Af Amer 39 (L) >60 mL/min/1.73m2    GFR MDRD Non Af Amer 32 (L) >60 mL/min/1.73m2   INR   Result Value Ref Range    INR 2.02 (H) 0.90 - 1.10   HM1 (CBC with Diff)   Result Value Ref Range    WBC 9.2 4.0 - 11.0 thou/uL    RBC 3.52 (L) 4.40 - 6.20 mill/uL    Hemoglobin 10.2 (L) 14.0 - 18.0 g/dL    Hematocrit 33.0 (L) 40.0 - 54.0 %    MCV 94 80 - 100 fL    MCH 29.0 27.0 - 34.0 pg    MCHC 30.9 (L) 32.0 - 36.0 g/dL    RDW 15.6 (H) 11.0 - 14.5 %    Platelets 265 140 - 440 thou/uL    MPV 10.0 8.5 - 12.5 fL    Neutrophils % 71 (H) 50 - 70 %    Lymphocytes % 20 20 - 40 %    Monocytes % 7 2 - 10 %    Eosinophils % 0 0 - 6 %    Basophils % 1 0 - 2 %    Immature Granulocyte % 1 (H) <=0 %    Neutrophils Absolute 6.6 2.0 - 7.7 thou/uL    Lymphocytes Absolute 1.9 0.8 - 4.4 thou/uL    Monocytes Absolute 0.6 0.0 - 0.9 thou/uL    Eosinophils Absolute 0.0 0.0 - 0.4 thou/uL    Basophils Absolute 0.1 0.0 - 0.2 thou/uL    Immature Granulocyte Absolute 0.1 (H) <=0.0 thou/uL       RADIOLOGY:  Reviewed all pertinent imaging. Please see official radiology report.  Ct Head Without Contrast    Result Date: 7/10/2021  EXAM: CT HEAD WO CONTRAST LOCATION: [facility] DATE/TIME: 7/10/2021 9:15 AM INDICATION: Head trauma, Fall COMPARISON: 06/01/2021 head CT TECHNIQUE: Routine CT Head without IV contrast. Multiplanar reformats. Dose reduction techniques were used. FINDINGS: INTRACRANIAL CONTENTS: No acute intracranial hemorrhage, extraaxial collection, or mass effect.  No CT evidence of acute infarct. Right frontal lobe encephalomalacia/gliosis anteriorly and inferiorly, sequela of remote ischemic change or possibly posttraumatic. Mild to moderate presumed chronic small vessel ischemic changes. Mild to moderate generalized volume loss. No hydrocephalus. VISUALIZED ORBITS/SINUSES/MASTOIDS: Prior bilateral cataract surgery.  Visualized portions of the orbits are otherwise unremarkable. No paranasal sinus mucosal disease. No middle ear or mastoid effusion. BONES/SOFT TISSUES: No acute abnormality.     1.  No significant change, no acute intracranial pathology. 2.  Mild to moderate age-related changes. 3.  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    Ct Cervical Spine Without Contrast    Result Date: 7/10/2021  EXAM: CT CERVICAL SPINE WITHOUT CONTRAST LOCATION: [facility] DATE/TIME: 07/10/2021, 9:17 AM INDICATION: Fall, trauma, neck pain. COMPARISON: 06/01/2021 CT cervical spine. TECHNIQUE: Routine CT Cervical Spine without IV contrast. Multiplanar reformats. Dose reduction techniques were used. FINDINGS: VERTEBRA: 2-3 mm of degenerative anterolisthesis at C7-T1 with 1-2 mm of retrolisthesis at C3-C4 and a trace of anterolisthesis at C4-C5 incidentally noted. Otherwise, anatomic alignment is maintained. Vertebral body heights are maintained. No bony erosion/destruction. No acute fracture or posttraumatic subluxation. CANAL/FORAMINA: Multilevel mild to moderate degenerative disc changes throughout the cervical spine. Moderate bilateral neural foraminal stenosis at C3-C4, C4-C5 and C6-C7 due to degenerative ridging and facet arthropathy. PARASPINAL: No extraspinal abnormality.     1.  No significant change, no acute fracture or posttraumatic subluxation. 2.  No high-grade spinal canal stenosis.     Xr Chest 1 View    Result Date: 7/10/2021  EXAM: XR CHEST 1 VIEW LOCATION: [facility] DATE/TIME: 7/10/2021 9:22 AM INDICATION: Fall with pain. COMPARISON: 06/12/2021.     Lungs are clear. No pleural effusion or pneumothorax. Normal heart size. Dense aortic atherosclerosis. No definitive acute fracture by radiograph.     Xr Pelvis Ap    Result Date: 7/10/2021  EXAM: XR PELVIS AP LOCATION: [facility] DATE/TIME: 7/10/2021 9:22 AM INDICATION:  Fall COMPARISON: 06/02/2021.     Left proximal femur fracture status post ORIF with an IM nail and proximal femoral neck screws. No hardware complication. Left femur fractures are in anatomic position/alignment. No new acute fracture visualized in the pelvis or hips. Normal hip joint alignment. The pelvic ring appears intact. Severe degenerative disc and facet changes in the lower lumbar spine. Brachytherapy seeds. Surgical clips in the left groin.      EKG:    Performed at: 08:47    Impression: Normal sinus rhythm  Nonspecific ST and T wave abnormality  Prolonged QT  Abnormal ECG    Rate: 85  Rhythm: Normal  Axis: 67  AL: 130  QRS: 90  QTC: 471  Nonspecific ST wave   No previous available for comparison.    I have independently review and interpreted the EKG(s)    I, David uBcio, am serving as a scribe to document services personally performed by Dr. Fuller based on my observation and the provider's statements to me. I, Sonia Fuller, DO attest that David Bucio is acting in a scribe capacity, has observed my performance of the services and has documented them in accordance with my direction.    Sonia Fuller DO  Emergency Medicine  [facility] EMERGENCY DEPARTMENT  [address]  [facility] [address]  Dept: [phone]  Loc: [phone]     Юлия Fuller DO  07/10/21 1220

## 2021-07-10 NOTE — ED NOTES
ED Notes by Debbie Monsalve RN at 7/10/2021 11:51 AM     Author: Debbie Monsalve RN Service: -- Author Type: Registered Nurse    Filed: 7/10/2021 11:51 AM Date of Service: 7/10/2021 11:51 AM Status: Signed    : Debbie Monsalve RN (Registered Nurse)       [facility] called and updated on patients return. Discharge provided to family. Family brought patient home.

## 2021-07-11 ENCOUNTER — LAB REQUISITION (OUTPATIENT)
Dept: LAB | Facility: CLINIC | Age: 86
End: 2021-07-11
Payer: MEDICARE

## 2021-07-11 DIAGNOSIS — E87.6 HYPOKALEMIA: ICD-10-CM

## 2021-07-12 VITALS — BODY MASS INDEX: 25.02 KG/M2 | WEIGHT: 155 LBS

## 2021-07-12 LAB — POTASSIUM BLD-SCNC: 3.9 MMOL/L (ref 3.5–5)

## 2021-07-12 PROCEDURE — 84132 ASSAY OF SERUM POTASSIUM: CPT | Mod: ORL | Performed by: NURSE PRACTITIONER

## 2021-07-12 PROCEDURE — 36415 COLL VENOUS BLD VENIPUNCTURE: CPT | Mod: ORL | Performed by: NURSE PRACTITIONER

## 2021-07-12 PROCEDURE — P9604 ONE-WAY ALLOW PRORATED TRIP: HCPCS | Mod: ORL | Performed by: NURSE PRACTITIONER

## 2021-07-14 ENCOUNTER — LAB REQUISITION (OUTPATIENT)
Dept: LAB | Facility: CLINIC | Age: 86
End: 2021-07-14
Payer: MEDICARE

## 2021-07-14 DIAGNOSIS — Z86.718 PERSONAL HISTORY OF OTHER VENOUS THROMBOSIS AND EMBOLISM: ICD-10-CM

## 2021-07-14 DIAGNOSIS — I48.0 PAROXYSMAL ATRIAL FIBRILLATION (H): ICD-10-CM

## 2021-07-14 DIAGNOSIS — D51.9 VITAMIN B12 DEFICIENCY ANEMIA, UNSPECIFIED: ICD-10-CM

## 2021-07-14 LAB
BASOPHILS # BLD AUTO: 0 10E3/UL (ref 0–0.2)
BASOPHILS NFR BLD AUTO: 0 %
CHOLEST SERPL-MCNC: 177 MG/DL
EOSINOPHIL # BLD AUTO: 0.2 10E3/UL (ref 0–0.7)
EOSINOPHIL NFR BLD AUTO: 2 %
ERYTHROCYTE [DISTWIDTH] IN BLOOD BY AUTOMATED COUNT: 15.9 % (ref 10–15)
HCT VFR BLD AUTO: 31.4 % (ref 40–53)
HDLC SERPL-MCNC: 51 MG/DL
HGB BLD-MCNC: 9.7 G/DL (ref 13.3–17.7)
IMM GRANULOCYTES # BLD: 0.1 10E3/UL
IMM GRANULOCYTES NFR BLD: 1 %
INR PPP: 2.08 (ref 0.85–1.15)
LDLC SERPL CALC-MCNC: 98 MG/DL
LYMPHOCYTES # BLD AUTO: 2 10E3/UL (ref 0.8–5.3)
LYMPHOCYTES NFR BLD AUTO: 23 %
MCH RBC QN AUTO: 28.9 PG (ref 26.5–33)
MCHC RBC AUTO-ENTMCNC: 30.9 G/DL (ref 31.5–36.5)
MCV RBC AUTO: 94 FL (ref 78–100)
MONOCYTES # BLD AUTO: 0.5 10E3/UL (ref 0–1.3)
MONOCYTES NFR BLD AUTO: 5 %
NEUTROPHILS # BLD AUTO: 6 10E3/UL (ref 1.6–8.3)
NEUTROPHILS NFR BLD AUTO: 69 %
NRBC # BLD AUTO: 0 10E3/UL
NRBC BLD AUTO-RTO: 0 /100
PLATELET # BLD AUTO: 249 10E3/UL (ref 150–450)
RBC # BLD AUTO: 3.36 10E6/UL (ref 4.4–5.9)
TRIGL SERPL-MCNC: 140 MG/DL
TSH SERPL DL<=0.005 MIU/L-ACNC: 1.33 UIU/ML (ref 0.3–5)
VIT B12 SERPL-MCNC: 1033 PG/ML (ref 213–816)
WBC # BLD AUTO: 8.8 10E3/UL (ref 4–11)

## 2021-07-14 PROCEDURE — P9604 ONE-WAY ALLOW PRORATED TRIP: HCPCS | Mod: ORL | Performed by: NURSE PRACTITIONER

## 2021-07-14 PROCEDURE — 36415 COLL VENOUS BLD VENIPUNCTURE: CPT | Mod: ORL | Performed by: NURSE PRACTITIONER

## 2021-07-14 PROCEDURE — 80061 LIPID PANEL: CPT | Mod: ORL | Performed by: NURSE PRACTITIONER

## 2021-07-14 PROCEDURE — 82607 VITAMIN B-12: CPT | Mod: ORL | Performed by: NURSE PRACTITIONER

## 2021-07-14 PROCEDURE — 84443 ASSAY THYROID STIM HORMONE: CPT | Mod: ORL | Performed by: NURSE PRACTITIONER

## 2021-07-14 PROCEDURE — 85610 PROTHROMBIN TIME: CPT | Mod: ORL | Performed by: NURSE PRACTITIONER

## 2021-07-14 PROCEDURE — 85025 COMPLETE CBC W/AUTO DIFF WBC: CPT | Mod: ORL | Performed by: NURSE PRACTITIONER

## 2021-07-15 ENCOUNTER — DOCUMENTATION ONLY (OUTPATIENT)
Dept: OTHER | Facility: CLINIC | Age: 86
End: 2021-07-15

## 2021-07-17 ENCOUNTER — LAB REQUISITION (OUTPATIENT)
Dept: LAB | Facility: CLINIC | Age: 86
End: 2021-07-17
Payer: MEDICARE

## 2021-07-17 DIAGNOSIS — I48.0 PAROXYSMAL ATRIAL FIBRILLATION (H): ICD-10-CM

## 2021-07-19 LAB — INR PPP: 1.33 (ref 0.85–1.15)

## 2021-07-19 PROCEDURE — 36415 COLL VENOUS BLD VENIPUNCTURE: CPT | Mod: ORL | Performed by: NURSE PRACTITIONER

## 2021-07-19 PROCEDURE — P9604 ONE-WAY ALLOW PRORATED TRIP: HCPCS | Mod: ORL | Performed by: NURSE PRACTITIONER

## 2021-07-19 PROCEDURE — 85610 PROTHROMBIN TIME: CPT | Mod: ORL | Performed by: NURSE PRACTITIONER

## 2021-07-23 ENCOUNTER — LAB REQUISITION (OUTPATIENT)
Dept: LAB | Facility: CLINIC | Age: 86
End: 2021-07-23
Payer: MEDICARE

## 2021-07-23 DIAGNOSIS — I48.19 OTHER PERSISTENT ATRIAL FIBRILLATION (H): ICD-10-CM

## 2021-07-26 LAB — INR PPP: 1.37 (ref 0.85–1.15)

## 2021-07-26 PROCEDURE — 36415 COLL VENOUS BLD VENIPUNCTURE: CPT | Mod: ORL | Performed by: NURSE PRACTITIONER

## 2021-07-26 PROCEDURE — 85610 PROTHROMBIN TIME: CPT | Mod: ORL | Performed by: NURSE PRACTITIONER

## 2021-07-26 PROCEDURE — P9603 ONE-WAY ALLOW PRORATED MILES: HCPCS | Mod: ORL | Performed by: NURSE PRACTITIONER

## 2021-07-29 ENCOUNTER — LAB REQUISITION (OUTPATIENT)
Dept: LAB | Facility: CLINIC | Age: 86
End: 2021-07-29
Payer: MEDICARE

## 2021-07-29 DIAGNOSIS — I48.0 PAROXYSMAL ATRIAL FIBRILLATION (H): ICD-10-CM

## 2023-01-01 ENCOUNTER — LAB REQUISITION (OUTPATIENT)
Dept: LAB | Facility: CLINIC | Age: 88
End: 2023-01-01
Payer: OTHER MISCELLANEOUS

## 2023-01-01 ENCOUNTER — LAB REQUISITION (OUTPATIENT)
Dept: LAB | Facility: HOSPITAL | Age: 88
End: 2023-01-01
Payer: MEDICARE

## 2023-01-01 ENCOUNTER — APPOINTMENT (OUTPATIENT)
Dept: CT IMAGING | Facility: HOSPITAL | Age: 88
DRG: 177 | End: 2023-01-01
Attending: EMERGENCY MEDICINE
Payer: MEDICARE

## 2023-01-01 ENCOUNTER — APPOINTMENT (OUTPATIENT)
Dept: RADIOLOGY | Facility: HOSPITAL | Age: 88
DRG: 177 | End: 2023-01-01
Attending: EMERGENCY MEDICINE
Payer: MEDICARE

## 2023-01-01 ENCOUNTER — HOSPITAL ENCOUNTER (INPATIENT)
Facility: HOSPITAL | Age: 88
LOS: 2 days | Discharge: HOME HEALTH | DRG: 177 | End: 2023-11-26
Attending: EMERGENCY MEDICINE | Admitting: INTERNAL MEDICINE
Payer: MEDICARE

## 2023-01-01 ENCOUNTER — APPOINTMENT (OUTPATIENT)
Dept: OCCUPATIONAL THERAPY | Facility: HOSPITAL | Age: 88
DRG: 177 | End: 2023-01-01
Attending: INTERNAL MEDICINE
Payer: MEDICARE

## 2023-01-01 ENCOUNTER — APPOINTMENT (OUTPATIENT)
Dept: PHYSICAL THERAPY | Facility: HOSPITAL | Age: 88
DRG: 177 | End: 2023-01-01
Attending: INTERNAL MEDICINE
Payer: MEDICARE

## 2023-01-01 VITALS
SYSTOLIC BLOOD PRESSURE: 144 MMHG | OXYGEN SATURATION: 95 % | TEMPERATURE: 98.8 F | WEIGHT: 154.98 LBS | BODY MASS INDEX: 24.91 KG/M2 | DIASTOLIC BLOOD PRESSURE: 67 MMHG | HEART RATE: 64 BPM | RESPIRATION RATE: 20 BRPM | HEIGHT: 66 IN

## 2023-01-01 DIAGNOSIS — R35.0 FREQUENCY OF MICTURITION: ICD-10-CM

## 2023-01-01 DIAGNOSIS — N18.9 ACUTE ON CHRONIC RENAL INSUFFICIENCY: ICD-10-CM

## 2023-01-01 DIAGNOSIS — U07.1 COVID-19 VIRUS INFECTION: ICD-10-CM

## 2023-01-01 DIAGNOSIS — Z86.39 HISTORY OF DIABETES MELLITUS: ICD-10-CM

## 2023-01-01 DIAGNOSIS — N39.0 URINARY TRACT INFECTION, SITE NOT SPECIFIED: ICD-10-CM

## 2023-01-01 DIAGNOSIS — Z79.01 ANTICOAGULATED ON WARFARIN: ICD-10-CM

## 2023-01-01 DIAGNOSIS — N28.9 ACUTE ON CHRONIC RENAL INSUFFICIENCY: ICD-10-CM

## 2023-01-01 DIAGNOSIS — G93.40 ENCEPHALOPATHY: ICD-10-CM

## 2023-01-01 LAB
ALBUMIN SERPL BCG-MCNC: 3.3 G/DL (ref 3.5–5.2)
ALBUMIN SERPL BCG-MCNC: 3.5 G/DL (ref 3.5–5.2)
ALBUMIN SERPL BCG-MCNC: 4.2 G/DL (ref 3.5–5.2)
ALBUMIN UR-MCNC: 100 MG/DL
ALBUMIN UR-MCNC: 20 MG/DL
ALBUMIN UR-MCNC: 50 MG/DL
ALP SERPL-CCNC: 108 U/L (ref 40–150)
ALP SERPL-CCNC: 84 U/L (ref 40–150)
ALP SERPL-CCNC: 89 U/L (ref 40–150)
ALT SERPL W P-5'-P-CCNC: 10 U/L (ref 0–70)
ALT SERPL W P-5'-P-CCNC: 13 U/L (ref 0–70)
ALT SERPL W P-5'-P-CCNC: 9 U/L (ref 0–70)
ANION GAP SERPL CALCULATED.3IONS-SCNC: 10 MMOL/L (ref 7–15)
ANION GAP SERPL CALCULATED.3IONS-SCNC: 11 MMOL/L (ref 7–15)
ANION GAP SERPL CALCULATED.3IONS-SCNC: 14 MMOL/L (ref 7–15)
APPEARANCE UR: ABNORMAL
APPEARANCE UR: CLEAR
APPEARANCE UR: CLEAR
AST SERPL W P-5'-P-CCNC: 18 U/L (ref 0–45)
AST SERPL W P-5'-P-CCNC: 23 U/L (ref 0–45)
AST SERPL W P-5'-P-CCNC: 42 U/L (ref 0–45)
ATRIAL RATE - MUSE: 65 BPM
BACTERIA #/AREA URNS HPF: ABNORMAL /HPF
BACTERIA #/AREA URNS HPF: ABNORMAL /HPF
BACTERIA BLD CULT: NO GROWTH
BACTERIA BLD CULT: NO GROWTH
BACTERIA UR CULT: ABNORMAL
BASE EXCESS BLDV CALC-SCNC: 1 MMOL/L
BASOPHILS # BLD AUTO: 0 10E3/UL (ref 0–0.2)
BASOPHILS NFR BLD AUTO: 1 %
BILIRUB DIRECT SERPL-MCNC: <0.2 MG/DL (ref 0–0.3)
BILIRUB DIRECT SERPL-MCNC: <0.2 MG/DL (ref 0–0.3)
BILIRUB SERPL-MCNC: 0.2 MG/DL
BILIRUB SERPL-MCNC: 0.2 MG/DL
BILIRUB SERPL-MCNC: 0.3 MG/DL
BILIRUB UR QL STRIP: NEGATIVE
BUN SERPL-MCNC: 22.9 MG/DL (ref 8–23)
BUN SERPL-MCNC: 24.2 MG/DL (ref 8–23)
BUN SERPL-MCNC: 24.4 MG/DL (ref 8–23)
CALCIUM SERPL-MCNC: 8.6 MG/DL (ref 8.2–9.6)
CALCIUM SERPL-MCNC: 8.7 MG/DL (ref 8.2–9.6)
CALCIUM SERPL-MCNC: 9.4 MG/DL (ref 8.2–9.6)
CHLORIDE SERPL-SCNC: 106 MMOL/L (ref 98–107)
CHLORIDE SERPL-SCNC: 107 MMOL/L (ref 98–107)
CHLORIDE SERPL-SCNC: 108 MMOL/L (ref 98–107)
COLOR UR AUTO: ABNORMAL
COLOR UR AUTO: ABNORMAL
COLOR UR AUTO: YELLOW
CREAT SERPL-MCNC: 2.5 MG/DL (ref 0.67–1.17)
CREAT SERPL-MCNC: 2.6 MG/DL (ref 0.67–1.17)
CREAT SERPL-MCNC: 2.83 MG/DL (ref 0.67–1.17)
CRP SERPL-MCNC: 56.9 MG/L
D DIMER PPP FEU-MCNC: 2.61 UG/ML FEU (ref 0–0.5)
DEPRECATED HCO3 PLAS-SCNC: 20 MMOL/L (ref 22–29)
DEPRECATED HCO3 PLAS-SCNC: 24 MMOL/L (ref 22–29)
DEPRECATED HCO3 PLAS-SCNC: 26 MMOL/L (ref 22–29)
DIASTOLIC BLOOD PRESSURE - MUSE: 50 MMHG
EGFRCR SERPLBLD CKD-EPI 2021: 20 ML/MIN/1.73M2
EGFRCR SERPLBLD CKD-EPI 2021: 22 ML/MIN/1.73M2
EGFRCR SERPLBLD CKD-EPI 2021: 23 ML/MIN/1.73M2
EOSINOPHIL # BLD AUTO: 0 10E3/UL (ref 0–0.7)
EOSINOPHIL NFR BLD AUTO: 0 %
ERYTHROCYTE [DISTWIDTH] IN BLOOD BY AUTOMATED COUNT: 15.9 % (ref 10–15)
ERYTHROCYTE [DISTWIDTH] IN BLOOD BY AUTOMATED COUNT: 16 % (ref 10–15)
ERYTHROCYTE [DISTWIDTH] IN BLOOD BY AUTOMATED COUNT: 16 % (ref 10–15)
FLUAV RNA SPEC QL NAA+PROBE: NEGATIVE
FLUBV RNA RESP QL NAA+PROBE: NEGATIVE
GLUCOSE SERPL-MCNC: 110 MG/DL (ref 70–99)
GLUCOSE SERPL-MCNC: 94 MG/DL (ref 70–99)
GLUCOSE SERPL-MCNC: 97 MG/DL (ref 70–99)
GLUCOSE UR STRIP-MCNC: 100 MG/DL
GLUCOSE UR STRIP-MCNC: NEGATIVE MG/DL
GLUCOSE UR STRIP-MCNC: NEGATIVE MG/DL
HCO3 BLDV-SCNC: 26 MMOL/L (ref 24–30)
HCT VFR BLD AUTO: 34.7 % (ref 40–53)
HCT VFR BLD AUTO: 35.4 % (ref 40–53)
HCT VFR BLD AUTO: 37.2 % (ref 40–53)
HGB BLD-MCNC: 10.9 G/DL (ref 13.3–17.7)
HGB BLD-MCNC: 11.4 G/DL (ref 13.3–17.7)
HGB BLD-MCNC: 11.9 G/DL (ref 13.3–17.7)
HGB UR QL STRIP: ABNORMAL
HGB UR QL STRIP: ABNORMAL
HGB UR QL STRIP: NEGATIVE
HOLD SPECIMEN: NORMAL
IMM GRANULOCYTES # BLD: 0.1 10E3/UL
IMM GRANULOCYTES NFR BLD: 1 %
INR PPP: 1.67 (ref 0.85–1.15)
INR PPP: 1.69 (ref 0.85–1.15)
INR PPP: 1.79 (ref 0.85–1.15)
INR PPP: 1.83 (ref 0.85–1.15)
INTERPRETATION ECG - MUSE: NORMAL
KETONES UR STRIP-MCNC: NEGATIVE MG/DL
LACTATE SERPL-SCNC: 0.9 MMOL/L (ref 0.7–2)
LACTATE SERPL-SCNC: 0.9 MMOL/L (ref 0.7–2)
LEUKOCYTE ESTERASE UR QL STRIP: ABNORMAL
LEUKOCYTE ESTERASE UR QL STRIP: NEGATIVE
LEUKOCYTE ESTERASE UR QL STRIP: NEGATIVE
LIPASE SERPL-CCNC: 21 U/L (ref 13–60)
LYMPHOCYTES # BLD AUTO: 1.1 10E3/UL (ref 0.8–5.3)
LYMPHOCYTES NFR BLD AUTO: 17 %
MAGNESIUM SERPL-MCNC: 1.6 MG/DL (ref 1.7–2.3)
MCH RBC QN AUTO: 28.9 PG (ref 26.5–33)
MCH RBC QN AUTO: 29.2 PG (ref 26.5–33)
MCH RBC QN AUTO: 29.2 PG (ref 26.5–33)
MCHC RBC AUTO-ENTMCNC: 31.4 G/DL (ref 31.5–36.5)
MCHC RBC AUTO-ENTMCNC: 32 G/DL (ref 31.5–36.5)
MCHC RBC AUTO-ENTMCNC: 32.2 G/DL (ref 31.5–36.5)
MCV RBC AUTO: 91 FL (ref 78–100)
MCV RBC AUTO: 91 FL (ref 78–100)
MCV RBC AUTO: 92 FL (ref 78–100)
MONOCYTES # BLD AUTO: 1 10E3/UL (ref 0–1.3)
MONOCYTES NFR BLD AUTO: 16 %
MUCOUS THREADS #/AREA URNS LPF: PRESENT /LPF
MUCOUS THREADS #/AREA URNS LPF: PRESENT /LPF
NEUTROPHILS # BLD AUTO: 4.2 10E3/UL (ref 1.6–8.3)
NEUTROPHILS NFR BLD AUTO: 65 %
NITRATE UR QL: NEGATIVE
NRBC # BLD AUTO: 0 10E3/UL
NRBC BLD AUTO-RTO: 0 /100
OXYHGB MFR BLDV: 37.6 % (ref 70–75)
P AXIS - MUSE: -13 DEGREES
PCO2 BLDV: 43 MM HG (ref 35–50)
PH BLDV: 7.39 [PH] (ref 7.35–7.45)
PH UR STRIP: 6 [PH] (ref 5–7)
PH UR STRIP: 6.5 [PH] (ref 5–7)
PH UR STRIP: 6.5 [PH] (ref 5–7)
PHOSPHATE SERPL-MCNC: 2.6 MG/DL (ref 2.5–4.5)
PLATELET # BLD AUTO: 114 10E3/UL (ref 150–450)
PLATELET # BLD AUTO: 126 10E3/UL (ref 150–450)
PLATELET # BLD AUTO: 134 10E3/UL (ref 150–450)
PO2 BLDV: 23 MM HG (ref 25–47)
POTASSIUM SERPL-SCNC: 3.7 MMOL/L (ref 3.4–5.3)
POTASSIUM SERPL-SCNC: 3.7 MMOL/L (ref 3.4–5.3)
POTASSIUM SERPL-SCNC: 4 MMOL/L (ref 3.4–5.3)
PR INTERVAL - MUSE: 170 MS
PROT SERPL-MCNC: 5.6 G/DL (ref 6.4–8.3)
PROT SERPL-MCNC: 5.8 G/DL (ref 6.4–8.3)
PROT SERPL-MCNC: 6.9 G/DL (ref 6.4–8.3)
QRS DURATION - MUSE: 94 MS
QT - MUSE: 416 MS
QTC - MUSE: 432 MS
R AXIS - MUSE: 39 DEGREES
RBC # BLD AUTO: 3.77 10E6/UL (ref 4.4–5.9)
RBC # BLD AUTO: 3.9 10E6/UL (ref 4.4–5.9)
RBC # BLD AUTO: 4.08 10E6/UL (ref 4.4–5.9)
RBC URINE: 1 /HPF
RBC URINE: <1 /HPF
RBC URINE: <1 /HPF
RSV RNA SPEC NAA+PROBE: NEGATIVE
SAO2 % BLDV: 38.2 % (ref 70–75)
SARS-COV-2 RNA RESP QL NAA+PROBE: POSITIVE
SODIUM SERPL-SCNC: 140 MMOL/L (ref 135–145)
SODIUM SERPL-SCNC: 142 MMOL/L (ref 135–145)
SODIUM SERPL-SCNC: 144 MMOL/L (ref 135–145)
SP GR UR STRIP: 1.01 (ref 1–1.03)
SP GR UR STRIP: 1.01 (ref 1–1.03)
SP GR UR STRIP: 1.02 (ref 1–1.03)
SQUAMOUS EPITHELIAL: <1 /HPF
SYSTOLIC BLOOD PRESSURE - MUSE: 81 MMHG
T AXIS - MUSE: 19 DEGREES
UROBILINOGEN UR STRIP-MCNC: <2 MG/DL
UROBILINOGEN UR STRIP-MCNC: <2 MG/DL
UROBILINOGEN UR STRIP-MCNC: NORMAL MG/DL
VENTRICULAR RATE- MUSE: 65 BPM
WBC # BLD AUTO: 6.4 10E3/UL (ref 4–11)
WBC # BLD AUTO: 7 10E3/UL (ref 4–11)
WBC # BLD AUTO: 8.8 10E3/UL (ref 4–11)
WBC CLUMPS #/AREA URNS HPF: PRESENT /HPF
WBC URINE: 1 /HPF
WBC URINE: 1 /HPF
WBC URINE: >182 /HPF

## 2023-01-01 PROCEDURE — 97166 OT EVAL MOD COMPLEX 45 MIN: CPT | Mod: GO

## 2023-01-01 PROCEDURE — 71045 X-RAY EXAM CHEST 1 VIEW: CPT

## 2023-01-01 PROCEDURE — 97116 GAIT TRAINING THERAPY: CPT | Mod: GP

## 2023-01-01 PROCEDURE — 85379 FIBRIN DEGRADATION QUANT: CPT | Performed by: INTERNAL MEDICINE

## 2023-01-01 PROCEDURE — XW033E5 INTRODUCTION OF REMDESIVIR ANTI-INFECTIVE INTO PERIPHERAL VEIN, PERCUTANEOUS APPROACH, NEW TECHNOLOGY GROUP 5: ICD-10-PCS | Performed by: INTERNAL MEDICINE

## 2023-01-01 PROCEDURE — 81001 URINALYSIS AUTO W/SCOPE: CPT | Mod: ORL | Performed by: INTERNAL MEDICINE

## 2023-01-01 PROCEDURE — 81001 URINALYSIS AUTO W/SCOPE: CPT | Mod: ORL | Performed by: NURSE ANESTHETIST, CERTIFIED REGISTERED

## 2023-01-01 PROCEDURE — 258N000003 HC RX IP 258 OP 636: Performed by: INTERNAL MEDICINE

## 2023-01-01 PROCEDURE — 85610 PROTHROMBIN TIME: CPT | Performed by: INTERNAL MEDICINE

## 2023-01-01 PROCEDURE — 36415 COLL VENOUS BLD VENIPUNCTURE: CPT | Performed by: INTERNAL MEDICINE

## 2023-01-01 PROCEDURE — 250N000013 HC RX MED GY IP 250 OP 250 PS 637: Performed by: HOSPITALIST

## 2023-01-01 PROCEDURE — 250N000011 HC RX IP 250 OP 636: Mod: JZ | Performed by: INTERNAL MEDICINE

## 2023-01-01 PROCEDURE — 82248 BILIRUBIN DIRECT: CPT | Performed by: EMERGENCY MEDICINE

## 2023-01-01 PROCEDURE — 250N000013 HC RX MED GY IP 250 OP 250 PS 637: Performed by: INTERNAL MEDICINE

## 2023-01-01 PROCEDURE — 83605 ASSAY OF LACTIC ACID: CPT | Performed by: EMERGENCY MEDICINE

## 2023-01-01 PROCEDURE — 120N000001 HC R&B MED SURG/OB

## 2023-01-01 PROCEDURE — 84100 ASSAY OF PHOSPHORUS: CPT | Performed by: HOSPITALIST

## 2023-01-01 PROCEDURE — 250N000013 HC RX MED GY IP 250 OP 250 PS 637: Performed by: EMERGENCY MEDICINE

## 2023-01-01 PROCEDURE — 97535 SELF CARE MNGMENT TRAINING: CPT | Mod: GO

## 2023-01-01 PROCEDURE — 36415 COLL VENOUS BLD VENIPUNCTURE: CPT | Performed by: HOSPITALIST

## 2023-01-01 PROCEDURE — 80053 COMPREHEN METABOLIC PANEL: CPT | Performed by: INTERNAL MEDICINE

## 2023-01-01 PROCEDURE — 82248 BILIRUBIN DIRECT: CPT | Performed by: HOSPITALIST

## 2023-01-01 PROCEDURE — 99239 HOSP IP/OBS DSCHRG MGMT >30: CPT | Performed by: HOSPITALIST

## 2023-01-01 PROCEDURE — 85027 COMPLETE CBC AUTOMATED: CPT | Performed by: HOSPITALIST

## 2023-01-01 PROCEDURE — 80053 COMPREHEN METABOLIC PANEL: CPT | Performed by: EMERGENCY MEDICINE

## 2023-01-01 PROCEDURE — 87086 URINE CULTURE/COLONY COUNT: CPT | Mod: ORL | Performed by: INTERNAL MEDICINE

## 2023-01-01 PROCEDURE — 81001 URINALYSIS AUTO W/SCOPE: CPT | Performed by: EMERGENCY MEDICINE

## 2023-01-01 PROCEDURE — 85025 COMPLETE CBC W/AUTO DIFF WBC: CPT | Performed by: EMERGENCY MEDICINE

## 2023-01-01 PROCEDURE — 87040 BLOOD CULTURE FOR BACTERIA: CPT | Performed by: EMERGENCY MEDICINE

## 2023-01-01 PROCEDURE — 87637 SARSCOV2&INF A&B&RSV AMP PRB: CPT | Performed by: EMERGENCY MEDICINE

## 2023-01-01 PROCEDURE — 258N000003 HC RX IP 258 OP 636: Performed by: EMERGENCY MEDICINE

## 2023-01-01 PROCEDURE — 83690 ASSAY OF LIPASE: CPT | Performed by: EMERGENCY MEDICINE

## 2023-01-01 PROCEDURE — 36415 COLL VENOUS BLD VENIPUNCTURE: CPT | Performed by: EMERGENCY MEDICINE

## 2023-01-01 PROCEDURE — 82805 BLOOD GASES W/O2 SATURATION: CPT | Performed by: EMERGENCY MEDICINE

## 2023-01-01 PROCEDURE — 86140 C-REACTIVE PROTEIN: CPT | Performed by: INTERNAL MEDICINE

## 2023-01-01 PROCEDURE — 83735 ASSAY OF MAGNESIUM: CPT | Performed by: HOSPITALIST

## 2023-01-01 PROCEDURE — 258N000003 HC RX IP 258 OP 636: Performed by: HOSPITALIST

## 2023-01-01 PROCEDURE — 97161 PT EVAL LOW COMPLEX 20 MIN: CPT | Mod: GP

## 2023-01-01 PROCEDURE — 99233 SBSQ HOSP IP/OBS HIGH 50: CPT | Performed by: HOSPITALIST

## 2023-01-01 PROCEDURE — 93005 ELECTROCARDIOGRAM TRACING: CPT | Performed by: EMERGENCY MEDICINE

## 2023-01-01 PROCEDURE — 99222 1ST HOSP IP/OBS MODERATE 55: CPT | Mod: AI | Performed by: INTERNAL MEDICINE

## 2023-01-01 PROCEDURE — 99285 EMERGENCY DEPT VISIT HI MDM: CPT | Mod: 25

## 2023-01-01 PROCEDURE — G1010 CDSM STANSON: HCPCS

## 2023-01-01 PROCEDURE — 85610 PROTHROMBIN TIME: CPT | Performed by: EMERGENCY MEDICINE

## 2023-01-01 PROCEDURE — 83605 ASSAY OF LACTIC ACID: CPT | Performed by: HOSPITALIST

## 2023-01-01 PROCEDURE — 85027 COMPLETE CBC AUTOMATED: CPT | Performed by: INTERNAL MEDICINE

## 2023-01-01 PROCEDURE — 97530 THERAPEUTIC ACTIVITIES: CPT | Mod: GP

## 2023-01-01 RX ORDER — PANTOPRAZOLE SODIUM 40 MG/1
40 TABLET, DELAYED RELEASE ORAL EVERY MORNING
Status: DISCONTINUED | OUTPATIENT
Start: 2023-01-01 | End: 2023-01-01 | Stop reason: HOSPADM

## 2023-01-01 RX ORDER — ZINC GLUCONATE 50 MG
50 TABLET ORAL DAILY
Status: DISCONTINUED | OUTPATIENT
Start: 2023-01-01 | End: 2023-01-01 | Stop reason: HOSPADM

## 2023-01-01 RX ORDER — RIVASTIGMINE 4.6 MG/24H
1 PATCH, EXTENDED RELEASE TRANSDERMAL DAILY
Status: DISCONTINUED | OUTPATIENT
Start: 2023-01-01 | End: 2023-01-01 | Stop reason: HOSPADM

## 2023-01-01 RX ORDER — SODIUM CHLORIDE 9 MG/ML
INJECTION, SOLUTION INTRAVENOUS CONTINUOUS
Status: DISCONTINUED | OUTPATIENT
Start: 2023-01-01 | End: 2023-01-01

## 2023-01-01 RX ORDER — ALLOPURINOL 100 MG/1
100 TABLET ORAL DAILY
Status: DISCONTINUED | OUTPATIENT
Start: 2023-01-01 | End: 2023-01-01 | Stop reason: HOSPADM

## 2023-01-01 RX ORDER — METOPROLOL TARTRATE 25 MG/1
25 TABLET, FILM COATED ORAL 2 TIMES DAILY
COMMUNITY
Start: 2022-12-19

## 2023-01-01 RX ORDER — SUCRALFATE 1 G/1
1 TABLET ORAL DAILY
COMMUNITY
Start: 2022-11-23

## 2023-01-01 RX ORDER — MEMANTINE HYDROCHLORIDE 10 MG/1
10 TABLET ORAL 2 TIMES DAILY
COMMUNITY

## 2023-01-01 RX ORDER — WARFARIN SODIUM 2 MG/1
2 TABLET ORAL
Status: COMPLETED | OUTPATIENT
Start: 2023-01-01 | End: 2023-01-01

## 2023-01-01 RX ORDER — MIRTAZAPINE 15 MG/1
1 TABLET, FILM COATED ORAL AT BEDTIME
COMMUNITY

## 2023-01-01 RX ORDER — MIRTAZAPINE 15 MG/1
15 TABLET, FILM COATED ORAL AT BEDTIME
Status: DISCONTINUED | OUTPATIENT
Start: 2023-01-01 | End: 2023-01-01 | Stop reason: HOSPADM

## 2023-01-01 RX ORDER — HALOPERIDOL 5 MG/ML
1 INJECTION INTRAMUSCULAR EVERY 6 HOURS PRN
Status: DISCONTINUED | OUTPATIENT
Start: 2023-01-01 | End: 2023-01-01 | Stop reason: HOSPADM

## 2023-01-01 RX ORDER — LIDOCAINE 40 MG/G
CREAM TOPICAL
Status: DISCONTINUED | OUTPATIENT
Start: 2023-01-01 | End: 2023-01-01 | Stop reason: HOSPADM

## 2023-01-01 RX ORDER — ALLOPURINOL 100 MG/1
1 TABLET ORAL DAILY
COMMUNITY

## 2023-01-01 RX ORDER — MEMANTINE HYDROCHLORIDE 10 MG/1
10 TABLET ORAL 2 TIMES DAILY
Status: DISCONTINUED | OUTPATIENT
Start: 2023-01-01 | End: 2023-01-01 | Stop reason: HOSPADM

## 2023-01-01 RX ORDER — ACETAMINOPHEN 325 MG/1
650 TABLET ORAL ONCE
Status: COMPLETED | OUTPATIENT
Start: 2023-01-01 | End: 2023-01-01

## 2023-01-01 RX ORDER — ZINC GLUCONATE 50 MG
50 TABLET ORAL DAILY
COMMUNITY

## 2023-01-01 RX ORDER — WARFARIN SODIUM 3 MG/1
3 TABLET ORAL
Status: COMPLETED | OUTPATIENT
Start: 2023-01-01 | End: 2023-01-01

## 2023-01-01 RX ORDER — ACETAMINOPHEN 500 MG
500 TABLET ORAL EVERY 4 HOURS PRN
Start: 2023-01-01

## 2023-01-01 RX ORDER — AMOXICILLIN 250 MG
2 CAPSULE ORAL AT BEDTIME
COMMUNITY
Start: 2022-01-12

## 2023-01-01 RX ORDER — RIVASTIGMINE 4.6 MG/24H
1 PATCH, EXTENDED RELEASE TRANSDERMAL DAILY
COMMUNITY
Start: 2022-05-16

## 2023-01-01 RX ORDER — AMLODIPINE BESYLATE 10 MG/1
1 TABLET ORAL DAILY
COMMUNITY
Start: 2022-12-19

## 2023-01-01 RX ORDER — PHENOL 1.4 %
10 AEROSOL, SPRAY (ML) MUCOUS MEMBRANE AT BEDTIME
COMMUNITY

## 2023-01-01 RX ORDER — ONDANSETRON 2 MG/ML
4 INJECTION INTRAMUSCULAR; INTRAVENOUS EVERY 6 HOURS PRN
Status: DISCONTINUED | OUTPATIENT
Start: 2023-01-01 | End: 2023-01-01 | Stop reason: HOSPADM

## 2023-01-01 RX ORDER — ONDANSETRON 4 MG/1
4 TABLET, ORALLY DISINTEGRATING ORAL EVERY 6 HOURS PRN
Status: DISCONTINUED | OUTPATIENT
Start: 2023-01-01 | End: 2023-01-01 | Stop reason: HOSPADM

## 2023-01-01 RX ORDER — LANOLIN ALCOHOL/MO/W.PET/CERES
5000 CREAM (GRAM) TOPICAL DAILY
Status: DISCONTINUED | OUTPATIENT
Start: 2023-01-01 | End: 2023-01-01 | Stop reason: HOSPADM

## 2023-01-01 RX ORDER — SODIUM CHLORIDE, SODIUM LACTATE, POTASSIUM CHLORIDE, CALCIUM CHLORIDE 600; 310; 30; 20 MG/100ML; MG/100ML; MG/100ML; MG/100ML
INJECTION, SOLUTION INTRAVENOUS CONTINUOUS
Status: DISCONTINUED | OUTPATIENT
Start: 2023-01-01 | End: 2023-01-01 | Stop reason: HOSPADM

## 2023-01-01 RX ORDER — ACETAMINOPHEN 325 MG/1
975 TABLET ORAL 3 TIMES DAILY
Status: DISCONTINUED | OUTPATIENT
Start: 2023-01-01 | End: 2023-01-01 | Stop reason: HOSPADM

## 2023-01-01 RX ORDER — SUCRALFATE 1 G/1
1 TABLET ORAL DAILY
Status: DISCONTINUED | OUTPATIENT
Start: 2023-01-01 | End: 2023-01-01 | Stop reason: HOSPADM

## 2023-01-01 RX ORDER — WARFARIN SODIUM 2.5 MG/1
2.5 TABLET ORAL DAILY
Qty: 30 TABLET | Refills: 1 | Status: SHIPPED | OUTPATIENT
Start: 2023-01-01

## 2023-01-01 RX ORDER — PANTOPRAZOLE SODIUM 40 MG/1
1 TABLET, DELAYED RELEASE ORAL EVERY MORNING
COMMUNITY
Start: 2022-11-23

## 2023-01-01 RX ORDER — WARFARIN SODIUM 2.5 MG/1
2.5 TABLET ORAL
Status: DISCONTINUED | OUTPATIENT
Start: 2023-01-01 | End: 2023-01-01 | Stop reason: HOSPADM

## 2023-01-01 RX ORDER — POLYETHYLENE GLYCOL 3350 17 G/17G
17 POWDER, FOR SOLUTION ORAL DAILY
Status: DISCONTINUED | OUTPATIENT
Start: 2023-01-01 | End: 2023-01-01 | Stop reason: HOSPADM

## 2023-01-01 RX ORDER — CALCIUM CARBONATE 500 MG/1
1000 TABLET, CHEWABLE ORAL 4 TIMES DAILY PRN
Status: DISCONTINUED | OUTPATIENT
Start: 2023-01-01 | End: 2023-01-01 | Stop reason: HOSPADM

## 2023-01-01 RX ORDER — AMOXICILLIN 250 MG
2 CAPSULE ORAL AT BEDTIME
Status: DISCONTINUED | OUTPATIENT
Start: 2023-01-01 | End: 2023-01-01 | Stop reason: HOSPADM

## 2023-01-01 RX ORDER — AMLODIPINE BESYLATE 5 MG/1
10 TABLET ORAL DAILY
Status: DISCONTINUED | OUTPATIENT
Start: 2023-01-01 | End: 2023-01-01 | Stop reason: HOSPADM

## 2023-01-01 RX ORDER — METOPROLOL TARTRATE 25 MG/1
25 TABLET, FILM COATED ORAL 2 TIMES DAILY
Status: DISCONTINUED | OUTPATIENT
Start: 2023-01-01 | End: 2023-01-01 | Stop reason: HOSPADM

## 2023-01-01 RX ADMIN — ACETAMINOPHEN 975 MG: 325 TABLET ORAL at 08:28

## 2023-01-01 RX ADMIN — ACETAMINOPHEN 975 MG: 325 TABLET ORAL at 08:21

## 2023-01-01 RX ADMIN — SODIUM CHLORIDE 50 ML: 9 INJECTION, SOLUTION INTRAVENOUS at 21:21

## 2023-01-01 RX ADMIN — MIRTAZAPINE 15 MG: 15 TABLET, FILM COATED ORAL at 21:17

## 2023-01-01 RX ADMIN — REMDESIVIR 200 MG: 100 INJECTION, POWDER, LYOPHILIZED, FOR SOLUTION INTRAVENOUS at 21:14

## 2023-01-01 RX ADMIN — SUCRALFATE 1 G: 1 TABLET ORAL at 08:28

## 2023-01-01 RX ADMIN — MEMANTINE 10 MG: 10 TABLET ORAL at 21:18

## 2023-01-01 RX ADMIN — REMDESIVIR 100 MG: 100 INJECTION, POWDER, LYOPHILIZED, FOR SOLUTION INTRAVENOUS at 20:22

## 2023-01-01 RX ADMIN — RIVASTIGMINE 1 PATCH: 4.6 PATCH TRANSDERMAL at 08:42

## 2023-01-01 RX ADMIN — METOPROLOL TARTRATE 25 MG: 25 TABLET, FILM COATED ORAL at 21:17

## 2023-01-01 RX ADMIN — SODIUM CHLORIDE 50 ML: 9 INJECTION, SOLUTION INTRAVENOUS at 20:47

## 2023-01-01 RX ADMIN — Medication 50 MG: at 08:27

## 2023-01-01 RX ADMIN — SUCRALFATE 1 G: 1 TABLET ORAL at 08:26

## 2023-01-01 RX ADMIN — SODIUM CHLORIDE, POTASSIUM CHLORIDE, SODIUM LACTATE AND CALCIUM CHLORIDE: 600; 310; 30; 20 INJECTION, SOLUTION INTRAVENOUS at 13:32

## 2023-01-01 RX ADMIN — SENNOSIDES AND DOCUSATE SODIUM 2 TABLET: 8.6; 5 TABLET ORAL at 21:19

## 2023-01-01 RX ADMIN — ALLOPURINOL 100 MG: 100 TABLET ORAL at 08:22

## 2023-01-01 RX ADMIN — ALLOPURINOL 100 MG: 100 TABLET ORAL at 08:27

## 2023-01-01 RX ADMIN — WARFARIN SODIUM 2 MG: 2 TABLET ORAL at 19:22

## 2023-01-01 RX ADMIN — SODIUM CHLORIDE: 9 INJECTION, SOLUTION INTRAVENOUS at 17:17

## 2023-01-01 RX ADMIN — MIRTAZAPINE 15 MG: 15 TABLET, FILM COATED ORAL at 20:22

## 2023-01-01 RX ADMIN — METOPROLOL TARTRATE 25 MG: 25 TABLET, FILM COATED ORAL at 08:28

## 2023-01-01 RX ADMIN — PANTOPRAZOLE SODIUM 40 MG: 40 TABLET, DELAYED RELEASE ORAL at 08:28

## 2023-01-01 RX ADMIN — SODIUM CHLORIDE 500 ML: 9 INJECTION, SOLUTION INTRAVENOUS at 12:56

## 2023-01-01 RX ADMIN — ACETAMINOPHEN 975 MG: 325 TABLET ORAL at 20:44

## 2023-01-01 RX ADMIN — Medication 250 MCG: at 08:23

## 2023-01-01 RX ADMIN — CYANOCOBALAMIN TAB 1000 MCG 5000 MCG: 1000 TAB at 08:21

## 2023-01-01 RX ADMIN — MEMANTINE 10 MG: 10 TABLET ORAL at 20:22

## 2023-01-01 RX ADMIN — AMLODIPINE BESYLATE 10 MG: 5 TABLET ORAL at 08:27

## 2023-01-01 RX ADMIN — RIVASTIGMINE 1 PATCH: 4.6 PATCH TRANSDERMAL at 08:22

## 2023-01-01 RX ADMIN — WARFARIN SODIUM 3 MG: 3 TABLET ORAL at 17:33

## 2023-01-01 RX ADMIN — METOPROLOL TARTRATE 25 MG: 25 TABLET, FILM COATED ORAL at 20:44

## 2023-01-01 RX ADMIN — ACETAMINOPHEN 975 MG: 325 TABLET ORAL at 21:17

## 2023-01-01 RX ADMIN — PANTOPRAZOLE SODIUM 40 MG: 40 TABLET, DELAYED RELEASE ORAL at 08:27

## 2023-01-01 RX ADMIN — CYANOCOBALAMIN TAB 1000 MCG 5000 MCG: 1000 TAB at 08:27

## 2023-01-01 RX ADMIN — SODIUM CHLORIDE, POTASSIUM CHLORIDE, SODIUM LACTATE AND CALCIUM CHLORIDE: 600; 310; 30; 20 INJECTION, SOLUTION INTRAVENOUS at 10:59

## 2023-01-01 RX ADMIN — ACETAMINOPHEN 975 MG: 325 TABLET ORAL at 13:31

## 2023-01-01 RX ADMIN — AMLODIPINE BESYLATE 10 MG: 5 TABLET ORAL at 17:16

## 2023-01-01 RX ADMIN — METOPROLOL TARTRATE 25 MG: 25 TABLET, FILM COATED ORAL at 08:26

## 2023-01-01 RX ADMIN — MEMANTINE 10 MG: 10 TABLET ORAL at 08:29

## 2023-01-01 RX ADMIN — ACETAMINOPHEN 650 MG: 325 TABLET ORAL at 12:56

## 2023-01-01 RX ADMIN — SUCRALFATE 1 G: 1 TABLET ORAL at 19:22

## 2023-01-01 RX ADMIN — MEMANTINE 10 MG: 10 TABLET ORAL at 08:26

## 2023-01-01 RX ADMIN — SODIUM CHLORIDE, POTASSIUM CHLORIDE, SODIUM LACTATE AND CALCIUM CHLORIDE: 600; 310; 30; 20 INJECTION, SOLUTION INTRAVENOUS at 00:05

## 2023-01-01 RX ADMIN — POLYETHYLENE GLYCOL 3350 17 G: 17 POWDER, FOR SOLUTION ORAL at 17:16

## 2023-01-01 RX ADMIN — ACETAMINOPHEN 975 MG: 325 TABLET ORAL at 14:41

## 2023-01-01 RX ADMIN — SENNOSIDES AND DOCUSATE SODIUM 2 TABLET: 8.6; 5 TABLET ORAL at 20:44

## 2023-01-01 RX ADMIN — Medication 250 MCG: at 08:28

## 2023-01-01 RX ADMIN — Medication 50 MG: at 08:28

## 2023-01-01 ASSESSMENT — ACTIVITIES OF DAILY LIVING (ADL)
ADLS_ACUITY_SCORE: 42
ADLS_ACUITY_SCORE: 42
ADLS_ACUITY_SCORE: 35
ADLS_ACUITY_SCORE: 36
ADLS_ACUITY_SCORE: 36
ADLS_ACUITY_SCORE: 42
ADLS_ACUITY_SCORE: 42
ADLS_ACUITY_SCORE: 35
ADLS_ACUITY_SCORE: 36
ADLS_ACUITY_SCORE: 36
ADLS_ACUITY_SCORE: 42
ADLS_ACUITY_SCORE: 36
ADLS_ACUITY_SCORE: 35
ADLS_ACUITY_SCORE: 36
ADLS_ACUITY_SCORE: 42
ADLS_ACUITY_SCORE: 36
ADLS_ACUITY_SCORE: 36
DEPENDENT_IADLS:: CLEANING;COOKING;LAUNDRY;SHOPPING;MEAL PREPARATION;MEDICATION MANAGEMENT;MONEY MANAGEMENT;TRANSPORTATION;INCONTINENCE
ADLS_ACUITY_SCORE: 42
ADLS_ACUITY_SCORE: 36
ADLS_ACUITY_SCORE: 36
ADLS_ACUITY_SCORE: 40
ADLS_ACUITY_SCORE: 36
ADLS_ACUITY_SCORE: 35
ADLS_ACUITY_SCORE: 42

## 2023-01-01 ASSESSMENT — ENCOUNTER SYMPTOMS
FEVER: 1
CONFUSION: 1
SHORTNESS OF BREATH: 1

## 2023-11-24 PROBLEM — Z79.01 ANTICOAGULATED ON WARFARIN: Status: ACTIVE | Noted: 2023-01-01

## 2023-11-24 PROBLEM — U07.1 COVID-19 VIRUS INFECTION: Status: ACTIVE | Noted: 2023-01-01

## 2023-11-24 PROBLEM — G93.40 ENCEPHALOPATHY: Status: ACTIVE | Noted: 2023-01-01

## 2023-11-24 NOTE — ED NOTES
Bed: [place] [address]  Expected date:   Expected time:   Means of arrival: Ambulance  Comments:  WBL: FEVER, + covid exposure

## 2023-11-24 NOTE — PHARMACY-ANTICOAGULATION SERVICE
Clinical Pharmacy - Warfarin Dosing Consult     Pharmacy has been consulted to manage this patient s warfarin therapy.  Indication: Atrial Fibrillation  Therapy Goal: INR 2-3  Provider/Team: Hospitalist  Warfarin Prior to Admission: Yes  Warfarin PTA Regimen: 2 mg daily  Significant drug interactions: asa  Dose Comments: Pt has COVID    INR   Date Value Ref Range Status   11/24/2023 1.79 (H) 0.85 - 1.15 Final   07/26/2021 1.37 (H) 0.85 - 1.15 Final     Comment:     Effective 7/11/2021, the reference range for this assay has changed.       Recommend warfarin 2 mg today.  Pharmacy will monitor Hany Santillan daily and order warfarin doses to achieve specified goal.      Please contact pharmacy as soon as possible if the warfarin needs to be held for a procedure or if the warfarin goals change.

## 2023-11-24 NOTE — ED TRIAGE NOTES
Patient comes from home with fever, wife is covid positive. 90% on EMS arrival, patient put on 2L nasal cannula, now at 98%. Patient has some confusion at baseline

## 2023-11-24 NOTE — H&P
Assessment and Plan  Principal Problem:    Anticoagulated on warfarin  Active Problems:    Encephalopathy    COVID-19 virus infection    89 year old male with a past medical history of DVT, GERD, hypertension, dementia who presents to the ER by EMS for evaluation of a fever.  Admitted with COVID and worsening confusion    Acute COVID infection  - Positive exposure to his wife tested COVID few days ago and patient presented with fever 101.9  - No hypoxia  - Chest x-ray shows increased interstitial prominence goes with viral pneumonia.  - Start IV remdesivir  - Anticoagulation with warfarin  - Hold off steroid as patient is not hypoxic    Acute encephalopathy  - Toxic, secondary to above  - Baseline dementia  - Patient had previous condition post hip surgery 2 years ago  - CT head is negative for acute changes  - Continue supportive care  - Discussed with f patient's son who is the POA, agreed on starting low-dose antipsychotic as needed  - Restart home Exelon patch  - Family will stay with patient overnight to help with behavior    Acute kidney injury  -Baseline chronic kidney disease stage III-IV  -Gentle IV hydration overnight  -Avoid nephrotoxic drugs  -Continue to monitor renal function    Hypertension  -Mild elevation of blood pressure on admission   -Restart home medications  -Continue to monitor blood pressure    Weakness and deconditioning  -Secondary to the above  -PT/OT evaluate    Goals of care  - Confirmed CODE STATUS with patient's son who is POA  - Patient is DNR  - Consider palliative care consult if no improvement    COVID STATUS: Positive date: 11/20/2023    VTE prophylaxis:  Warfarin  DIET: Orders Placed This Encounter      Combination Diet Regular Diet Adult      Disposition/Barriers to discharge: IV remdesivir, confusion  Code Status:No CPR- Do NOT Intubate    HPI: History is limited by patient mental condition mainly taken from family  Hany WADDELL Jacque is a 89 year old male with a past  medical history of DVT, GERD, hypertension, dementia who presents by EMS for evaluation of a fever.   Basically the patient reports that he has pain all over and he did fall down, but is unsure when or how. He is short of breath and his temperature is 101.9 degrees.   Per nurse reports that he has a fever and confusion. She adds on that he was give 2 liters of oxygen and tylenol. His wife is positive for covid.   Per son reports that his father was got shaky on 11/22/23 and he was being monitored since then. Per son states that his father would get a constant temperature reading and given tylenol. Today when checking his temperature, it was high and so per son had called EMS. Per son adds on that his father has been having coughing, having diarrhea, and takes coumadin. He has not had any falls and has been feeling weak.        Past Medical History:   Diagnosis Date    Arthritis     Arthritis     Arthritis     Asthma     Asthma     Cancer (H) 2010    Prostate cancer, s/p brachy implant 5-2010 Dr. Li/ Jose    Diabetes (H)     DM (diabetes mellitus) (H)     DM (diabetes mellitus) (H)     DVT (deep venous thrombosis) (H)     DVT (deep venous thrombosis) (H)     Eczema     Facial basal cell cancer     Facial basal cell cancer     GERD (gastroesophageal reflux disease)     GERD (gastroesophageal reflux disease)     HTN (hypertension)     HTN (hypertension)     Hypertension     Kidney disease     Kidney disease     Kidney disease     Lupus (H)     Lupus (H)     Lupus (H)     Prostate cancer (H)     Prostate cancer (H)     Psoriasis     PVD (peripheral vascular disease) (H24)     PVD (peripheral vascular disease) (H24)     Thyroid disease     Thyroid disease     Thyroid disease     Uncomplicated asthma      Past Surgical History:   Procedure Laterality Date    APPENDECTOMY  1936    APPENDECTOMY      CARDIAC SURGERY  1999    Left fem pop    CARDIAC SURGERY      ENT SURGERY  1933    Tonsillectomy    HC VASCULAR SURGERY  PROCEDURE UNLIST      IR ABDOMINAL AORTOGRAM  2/20/2001    IR EXTREMITY ANGIOGRAM BILATERAL  2/20/2001    IR LUMBAR EPIDURAL STEROID INJECTION  9/8/2017    IR MISCELLANEOUS PROCEDURE  2/20/2001    IR MISCELLANEOUS PROCEDURE  2/20/2001    IR MISCELLANEOUS PROCEDURE  2/21/2001    IR MISCELLANEOUS PROCEDURE  2/21/2001    IR MISCELLANEOUS PROCEDURE  2/22/2001    IR MISCELLANEOUS PROCEDURE  2/22/2001    IR MISCELLANEOUS PROCEDURE  2/22/2001    IR MISCELLANEOUS PROCEDURE  2/22/2001    IR MISCELLANEOUS PROCEDURE  2/22/2001    IR MISCELLANEOUS PROCEDURE  2/23/2001    IR MISCELLANEOUS PROCEDURE  2/23/2001    IR MISCELLANEOUS PROCEDURE  6/19/2001    IR MISCELLANEOUS PROCEDURE  6/19/2001    IR MISCELLANEOUS PROCEDURE  6/20/2001    KNEE SURGERY      NECK SURGERY      ORTHOPEDIC SURGERY      right knee work in 1950, followed by replacement 2006    REPLACEMENT TOTAL KNEE Right     TONSILLECTOMY      ZZC STOMACH SURGERY PROCEDURE UNLISTED       Family History   Problem Relation Age of Onset    Cancer Daughter 43        pancreatic    Cancer Daughter     Diabetes Mother     Hyperlipidemia Mother     Hypertension Brother     Coronary Artery Disease Brother      Social History     Socioeconomic History    Marital status:      Spouse name: Not on file    Number of children: Not on file    Years of education: Not on file    Highest education level: Not on file   Occupational History    Not on file   Tobacco Use    Smoking status: Former     Packs/day: 1.00     Years: 40.00     Additional pack years: 0.00     Total pack years: 40.00     Types: Cigarettes    Smokeless tobacco: Not on file   Substance and Sexual Activity    Alcohol use: No    Drug use: No    Sexual activity: Not Currently   Other Topics Concern    Parent/sibling w/ CABG, MI or angioplasty before 65F 55M? Not Asked   Social History Narrative    Not on file     Social Determinants of Health     Financial Resource Strain: Not on file   Food Insecurity: Not on file    Transportation Needs: Not on file   Physical Activity: Not on file   Stress: Not on file   Social Connections: Not on file   Interpersonal Safety: Not on file   Housing Stability: Not on file     Allergies   Allergen Reactions    Ace Inhibitors     Penicillins      Longstanding allergy, unknown reaction       PRIOR TO ADMISSION MEDICATIONS   Medications Prior to Admission   Medication Sig Dispense Refill Last Dose    acetaminophen (TYLENOL) 500 MG tablet Take 500 mg by mouth every 8 hours as needed for pain   11/23/2023 at pm    allopurinol (ZYLOPRIM) 100 MG tablet Take 1 tablet by mouth daily   11/23/2023 at am    amLODIPine (NORVASC) 10 MG tablet Take 1 tablet by mouth daily   11/23/2023 at am    aspirin 81 MG tablet Take 81 mg by mouth daily   11/23/2023 at am    Cyanocobalamin 5000 MCG CAPS Take 1 capsule by mouth daily   11/23/2023 at am    Melatonin 10 MG TABS tablet Take 10 mg by mouth at bedtime   11/23/2023 at pm    memantine (NAMENDA) 10 MG tablet Take 10 mg by mouth 2 times daily   11/23/2023 at pm    metoprolol tartrate (LOPRESSOR) 25 MG tablet Take 25 mg by mouth 2 times daily   11/23/2023 at pm    mirtazapine (REMERON) 15 MG tablet Take 1 tablet by mouth at bedtime   11/23/2023 at pm    NONFORMULARY Take 1 tablet by mouth daily Mini blood builder OTC, contains iron   11/23/2023 at am    pantoprazole (PROTONIX) 40 MG EC tablet Take 1 tablet by mouth every morning   11/23/2023 at am    rivastigmine (EXELON) 4.6 MG/24HR 24 hr patch Place 1 patch onto the skin daily For 12 hours. Put on in am and take off in pm   11/23/2023 at am    senna-docusate (SENOKOT-S/PERICOLACE) 8.6-50 MG tablet Take 2 tablets by mouth at bedtime   11/23/2023 at pm    sucralfate (CARAFATE) 1 GM tablet Take 1 g by mouth daily   11/23/2023 at am    Vitamin D3 (CHOLECALCIFEROL) 125 MCG (5000 UT) tablet Take 10,000 Units by mouth daily   11/23/2023 at am    warfarin ANTICOAGULANT (COUMADIN) 2 MG tablet Take 2 mg by mouth daily    11/23/2023 at pm    zinc gluconate 50 MG tablet Take 50 mg by mouth daily   11/23/2023 at am        REVIEW OF SYSTEMS:  12 point reviewed pertinent negatives and positives in HPI all others negative     PHYSICAL EXAM  B/P:146/60 T:98.9 P:54 R: 18     Head:    Normocephalic, without obvious abnormality, atraumatic   Eyes:    PERRL, conjunctiva/corneas clear, EOM's intact,both eyes    Ears:    Normal external ear canals no drainage or erythema bilat.   Nose:   Nares normal by gross inspection,  mucosa normal, no drainage or sinus tenderness   Throat:   Lips, mucosa, and tongue normal; teeth and gums normal   Neck:   Supple, symmetrical, trachea midline, no adenopathy;        thyroid:  No enlargement/tenderness/nodules   Back:     Symmetric, no curvature, ROM normal, no CVA tenderness   Lungs:   Decreased breath sounds at lung bases with scattered rhonchi and mild expiratory wheeze   Chest wall:    No tenderness or deformity   Heart:    Regular rate and rhythm, S1 and S2 normal, I/VI systolic murmur, no rubs, no JVD, no edema   Abdomen:     Soft, non-tender, bowel sounds active all four quadrants,     no masses, no hepatosplenomegaly   Musculoskeletal:   Extremities are warm and non-tender, atraumatic, no joint swelling or tenderness   Pulses:   2+ and symmetric all extremities   Skin:   Skin color, texture, turgor normal, no rashes or lesions on exposed areas, please see nursing assessment for full skin assessment   Neurologic: Confused, self oriented only.  Moves 4 extremities.         PERTINENT LABS and RADIOLOGY     I have personally reviewed the following data over the past 24 hrs:    6.4  \   11.9 (L)   / 134 (L)     144 107 24.2 (H) /  110 (H)   4.0 26 2.83 (H) \     ALT: 9 AST: 18 AP: 108 TBILI: 0.3   ALB: 4.2 TOT PROTEIN: 6.9 LIPASE: 21     Procal: N/A CRP: N/A Lactic Acid: 0.9       INR:  1.69 (H) PTT:  N/A   D-dimer:  N/A Fibrinogen:  N/A       Imaging results reviewed over the past 24 hrs:   Recent Results  (from the past 24 hour(s))   XR Chest Port 1 View    Narrative    EXAM: XR CHEST PORT 1 VIEW  LOCATION: [facility]  DATE: 11/24/2023    INDICATION: fever  COMPARISON: 03/14/2022.      Impression    IMPRESSION: Since 03/14/2022, increased diffuse interstitial prominence which could be seen with viral pneumonia. Prominent skin fold overlying the right upper lobe laterally. Remainder not significantly changed. Aortic calcification. Cardiomediastinal   silhouette otherwise within normal limits. No definite pleural effusion.   Head CT w/o contrast    Narrative    EXAM: CT HEAD W/O CONTRAST  LOCATION: [facility]  DATE: 11/24/2023    INDICATION: CONFUSION  Anticoagulated.  Evaluate for bleed.  COMPARISON: Head CT 07/10/2021  TECHNIQUE: Routine CT Head without IV contrast. Multiplanar reformats. Dose reduction techniques were used.    FINDINGS:  INTRACRANIAL CONTENTS: No intracranial hemorrhage. Mild diffuse cerebral parenchymal volume loss. No midline shift. The basilar cisterns are patent. No cerebellar tonsillar ectopia. Moderate periventricular and scattered foci of deep white matter   hypodensities involving both cerebral hemispheres. No CT evidence for an acute infarct. Moderate area of encephalomalacia involving the anterior medial right frontal lobe.    VISUALIZED ORBITS/SINUSES/MASTOIDS: No intraorbital abnormality. Mild mucosal thickening of the ethmoid air cells. No middle ear or mastoid effusion.    BONES/SOFT TISSUES: No acute abnormality.      Impression    IMPRESSION:  1.  No intracranial hemorrhage, mass lesions, hydrocephalus or CT evidence for an acute infarct.  2.  Mild diffuse cerebral parenchymal volume loss. Presumed chronic hypertensive/microvascular ischemic white matter changes.  3.  Moderate area of encephalomalacia involving the anterior medial right frontal lobe.     EKG: Sinus rhythm, nonspecific T wave changes, no significant acute  changes    Discussed with patient, family and nursing staff     Juan M Cox MD  [facility] Internal Medicine Hospitalist  [phone]

## 2023-11-24 NOTE — ED NOTES
[facility] ED Handoff Report    ED Chief Complaint: fever    ED Diagnosis:  (U07.1) COVID-19 virus infection  Comment:   Plan:     (G93.40) Encephalopathy  Comment:   Plan:     (Z79.01) Anticoagulated on warfarin  Comment:   Plan:        PMH:    Past Medical History:   Diagnosis Date    Arthritis     Arthritis     Arthritis     Asthma     Asthma     Cancer (H) 2010    Prostate cancer, s/p brachy implant 5-2010 Dr. Li/ Jose    Diabetes (H)     DM (diabetes mellitus) (H)     DM (diabetes mellitus) (H)     DVT (deep venous thrombosis) (H)     DVT (deep venous thrombosis) (H)     Eczema     Facial basal cell cancer     Facial basal cell cancer     GERD (gastroesophageal reflux disease)     GERD (gastroesophageal reflux disease)     HTN (hypertension)     HTN (hypertension)     Hypertension     Kidney disease     Kidney disease     Kidney disease     Lupus (H)     Lupus (H)     Lupus (H)     Prostate cancer (H)     Prostate cancer (H)     Psoriasis     PVD (peripheral vascular disease) (H24)     PVD (peripheral vascular disease) (H24)     Thyroid disease     Thyroid disease     Thyroid disease     Uncomplicated asthma         Code Status:  No Order     Falls Risk: Yes Band: Applied    Current Living Situation/Residence: lives with a significant other and help from multiple family members as well as a home health nurse    Elimination Status: Continent: Yes     Activity Level: SBA w/ walker    Patients Preferred Language:  English     Needed: No    Vital Signs:  /57   Pulse 65   Temp 99.2  F (37.3  C) (Oral)   Resp 22   SpO2 97%      Cardiac Rhythm: nsr    Pain Score: 0/10    Is the Patient Confused:  Yes    Last Food or Drink: 11/24/23 at 1200    Focused Assessment:  patient comes in with fever and lethargy, positive for covid, some confusion but family states this is intermittent on a regular basis at home. 20G Right forearm.     Tests Performed: Done: Labs and Imaging    Treatments  Provided:  fluids, tylenol, chest xray    Family Dynamics/Concerns: No    Family Updated On Visitor Policy: Yes    Plan of Care Communicated to Family: Yes    Who Was Updated about Plan of Care: son    Belongings Checklist Done and Signed by Patient: Yes    Medications sent with patient: none    Covid: symptomatic, positive    Additional Information:     RN: Haily Conley RN   11/24/2023 2:46 PM

## 2023-11-24 NOTE — PHARMACY-ADMISSION MEDICATION HISTORY
Pharmacist Admission Medication History    Admission medication history is complete. The information provided in this note is only as accurate as the sources available at the time of the update.    Information Source(s): Family member via in-person and phone with N95 mask    Pertinent Information: none    Changes made to PTA medication list:  Added: allopurinol, Protonix, rivastigmine, amlodipine, metoprolol, mirtazapine, melatonin, senna s, vitamin B12, vitamin D, sucralfate, OTC with iron, zinc  Deleted: calcium, candesartan, capsaicin, coenzme Q10, febuxostat, gabapentin, glimepiride, lidocaine patch, methocarbamol, nicacin, Miralax, Zantac  Changed: Tylenol, warfarin, vitamin D         Allergies reviewed with patient and updates made in EHR: no but family reported that he has had an allergy to penicillin for a long time.    Medication History Completed By: Page Silva RPH 11/24/2023 3:32 PM    PTA Med List   Medication Sig Note Last Dose    acetaminophen (TYLENOL) 500 MG tablet Take 500 mg by mouth every 8 hours as needed for pain  11/23/2023 at pm    allopurinol (ZYLOPRIM) 100 MG tablet Take 1 tablet by mouth daily  11/23/2023 at am    amLODIPine (NORVASC) 10 MG tablet Take 1 tablet by mouth daily  11/23/2023 at am    aspirin 81 MG tablet Take 81 mg by mouth daily  11/23/2023 at am    Cyanocobalamin 5000 MCG CAPS Take 1 capsule by mouth daily  11/23/2023 at am    Melatonin 10 MG TABS tablet Take 10 mg by mouth at bedtime  11/23/2023 at pm    memantine (NAMENDA) 10 MG tablet Take 10 mg by mouth 2 times daily  11/23/2023 at pm    metoprolol tartrate (LOPRESSOR) 25 MG tablet Take 25 mg by mouth 2 times daily  11/23/2023 at pm    mirtazapine (REMERON) 15 MG tablet Take 1 tablet by mouth at bedtime  11/23/2023 at pm    NONFORMULARY Take 1 tablet by mouth daily Mini blood builder OTC, contains iron  11/23/2023 at am    pantoprazole (PROTONIX) 40 MG EC tablet Take 1 tablet by mouth every morning  11/23/2023 at am     rivastigmine (EXELON) 4.6 MG/24HR 24 hr patch Place 1 patch onto the skin daily For 12 hours. Put on in am and take off in pm 11/24/2023: Due to side effects, the family were instructed to leave the patch on for only 12 hours 11/23/2023 at am    senna-docusate (SENOKOT-S/PERICOLACE) 8.6-50 MG tablet Take 2 tablets by mouth at bedtime  11/23/2023 at pm    sucralfate (CARAFATE) 1 GM tablet Take 1 g by mouth daily  11/23/2023 at am    Vitamin D3 (CHOLECALCIFEROL) 125 MCG (5000 UT) tablet Take 10,000 Units by mouth daily  11/23/2023 at am    warfarin ANTICOAGULANT (COUMADIN) 2 MG tablet Take 2 mg by mouth daily  11/23/2023 at pm    zinc gluconate 50 MG tablet Take 50 mg by mouth daily  11/23/2023 at am

## 2023-11-24 NOTE — CONSULTS
Care Management Initial Consult    General Information  Assessment completed with: Patient, son Jose Martin Whitley  Type of CM/SW Visit: Initial Assessment    Primary Care Provider verified and updated as needed: No   Readmission within the last 30 days:           Advance Care Planning:            Communication Assessment  Patient's communication style: spoken language (English or Bilingual)             Cognitive  Cognitive/Neuro/Behavioral:                        Living Environment:   People in home: spouse, child(el), adult     Current living Arrangements: apartment, condominium      Able to return to prior arrangements: yes  Living Arrangement Comments: independent condo with family    Family/Social Support:  Care provided by: self, child(el)  Provides care for: no one, unable/limited ability to care for self  Marital Status:   Wife, Children, PCA          Description of Support System: Supportive    Support Assessment: Adequate family and caregiver support    Current Resources:   Patient receiving home care services: No     Community Resources:    Equipment currently used at home:    Supplies currently used at home: Incontinence Supplies    Employment/Financial:  Employment Status: retired        Financial Concerns:     Referral to Financial Worker: No       Does the patient's insurance plan have a 3 day qualifying hospital stay waiver?  No    Lifestyle & Psychosocial Needs:  Social Determinants of Health     Food Insecurity: Not on file   Depression: Not on file   Housing Stability: Not on file   Tobacco Use: Medium Risk (11/24/2023)    Patient History     Smoking Tobacco Use: Former     Smokeless Tobacco Use: Unknown     Passive Exposure: Not on file   Financial Resource Strain: Not on file   Alcohol Use: Not on file   Transportation Needs: Not on file   Physical Activity: Not on file   Interpersonal Safety: Not on file   Stress: Not on file   Social Connections: Not on file       Functional Status:  Prior to  admission patient needed assistance:   Dependent ADLs:: Ambulation-walker, Dressing, Bathing, Positioning, Transfers, Toileting  Dependent IADLs:: Cleaning, Cooking, Laundry, Shopping, Meal Preparation, Medication Management, Money Management, Transportation, Incontinence  Assesssment of Functional Status: Not at baseline with ADL Functioning    Mental Health Status:  Mental Health Status: No Current Concerns       Chemical Dependency Status:  Chemical Dependency Status: No Current Concerns             Values/Beliefs:  Spiritual, Cultural Beliefs, Alevism Practices, Values that affect care: no               Additional Information:  Chart reviewed-. met with patient's son Jose Martin Whitley in the lucas due to COVID 19 precautions. Patient and his wife live in an independent apartment. Pt has 5 adult children who provide all of the care for pt and his wife, in addition to two private pay care aids (not through an agency per son). Prior to hospitalization, patient's baseline is walking with a walker, able to go on outings with family. Family states they have necessary equipment at home. All children are supportive, son Jose Martin Whitley primary contact at this time.   CM will follow and assist with discharge planning as needed.     Amelia Delgado, [email or web address]

## 2023-11-24 NOTE — ED PROVIDER NOTES
EMERGENCY DEPARTMENT ENCOUNTER      NAME: Hany Santillan  AGE: 89 year old male  YOB: 1928  MRN: [id number]  EVALUATION DATE & TIME: 11/24/2023 11:51 AM    PCP: Abdulkadir Starr    ED PROVIDER: Betsey Kessler M.D.      CHIEF COMPLAINT     Chief Complaint   Patient presents with    Fever         FINAL IMPRESSION:     1. COVID-19 virus infection    2. Encephalopathy    3. Anticoagulated on warfarin    4. Acute on chronic renal insufficiency          MEDICAL DECISION MAKING:       Pertinent Labs & Imaging studies reviewed. (See chart for details)    89 year old male presents to the Emergency Department for evaluation of weakness    History  Supplemental history from family  External Record(s) review as documented below    Exam  Ill, confused, febrile, dry mucous membranes, drainage from the left eye.    Differential Diagnosis include but not limited to pneumonia, bacterial pneumonia, intracranial hemorrhage, UTI, external normalities, acute coronary syndrome, among others.      Vital Signs: Reviewed  EKG: Sinus rhythm  Imaging: I independently interpreted cxr no free air.Formal read by radiologist.  Home meds: Reviewed  ED meds: Tylenol  Fluids: Normal saline  Labs:  K 4  Cr 2.83  Wbc 6.4  Hgb 11.9  platelets 134    Clinical Impression and Decision Making    89-year-old male lives independently here with generalized weakness and fever.  Lives independently.  Appears ill on exam.  Oriented to self recognizes son.  No recent trauma.  Infectious evaluation included a chest x-ray with no consolidation.  He is positive for COVID likely given the fever.  Satting 94% therefore Decadron was not given.  Given the encephalopathy and anticoagulated CT head ordered.  Admitted to the hospitalist service for generalized weakness and COVID infection and encephalopathy.      In addition to the work out documented, I considered the following work up Decadron.  No hypoxia.           Medical Decision  Making    History:  Supplemental history from: Other: See chart  External Record(s) reviewed: Other: See chart    Work Up:  Chart documentation includes differential considered and any EKGs or imaging independently interpreted by provider, where specified.  In additional to work up documented, I considered the following work up: Other: See chart    External consultation:  Discussion of management with another provider: Documented in chart, if applicable    Complicating factors:  Care impacted by chronic illness: Diabetes, Hypertension, and Other: Kidney disease, thyroid disease, and lupus  Care affected by social determinants of health: N/A    Disposition considerations: Admit.          Review of External Records  Hospital/Clinic: [facility]   Date: 3/29/23   Neurology  Vascular and neurodegenerative dementia  History of stroke, atrial fibrillation, and takes coumadin.       External Consultation  Hospitalist, Dr. Cox      ED COURSE     11:51 AM Met with and introduced myself to the patient. Discussed history and plan of care.  1:28 PM Met with patients sons for more history.  1:42 PM Spoke with , Hospitalist, regarding plan for admission.   At the conclusion of the encounter I discussed the results of all of the tests and the disposition. The questions were answered. The patient and patients two sons acknowledged understanding and was agreeable with the care plan.         MEDICATIONS GIVEN IN THE EMERGENCY:     Medications   sodium chloride 0.9% BOLUS 500 mL (0 mLs Intravenous Stopped 11/24/23 1344)   acetaminophen (TYLENOL) tablet 650 mg (650 mg Oral $Given 11/24/23 1256)       NEW PRESCRIPTIONS STARTED AT TODAY'S ER VISIT     New Prescriptions    No medications on file          =================================================================    HPI     Patient information was obtained from: Patient, nurse, son    Use of : N/A      Hany Santilaln is a 89 year old male who presents by  EMS for evaluation of a fever.     The patient reports that he has pain all over and he did fall down, but is unsure when or how. He is short of breath and his temperature is 101.9 degrees.     Per nurse reports that he has a fever and confusion. She adds on that he was give 2 liters of oxygen and tylenol. His wife is positive for covid.     Per son reports that his father was got shaky on 11/22/23 and he was being monitored since then. Per son states that his father would get a constant temperature reading and given tylenol. Today when checking his temperature, it was high and so per son had called EMS. Per son adds on that his father has been having coughing, having diarrhea, and takes coumadin. He has not had any falls and has been feeling weak.     Denies chest pain.       REVIEW OF SYSTEMS   Review of Systems   Constitutional:  Positive for fever.   Respiratory:  Positive for shortness of breath.    Cardiovascular:  Negative for chest pain.   Psychiatric/Behavioral:  Positive for confusion.    All other systems reviewed and are negative.       PAST MEDICAL HISTORY:     Past Medical History:   Diagnosis Date    Arthritis     Arthritis     Arthritis     Asthma     Asthma     Cancer (H) 2010    Prostate cancer, s/p brachy implant 5-2010 Dr. Li/ [place]    Diabetes (H)     DM (diabetes mellitus) (H)     DM (diabetes mellitus) (H)     DVT (deep venous thrombosis) (H)     DVT (deep venous thrombosis) (H)     Eczema     Facial basal cell cancer     Facial basal cell cancer     GERD (gastroesophageal reflux disease)     GERD (gastroesophageal reflux disease)     HTN (hypertension)     HTN (hypertension)     Hypertension     Kidney disease     Kidney disease     Kidney disease     Lupus (H)     Lupus (H)     Lupus (H)     Prostate cancer (H)     Prostate cancer (H)     Psoriasis     PVD (peripheral vascular disease) (H24)     PVD (peripheral vascular disease) (H24)     Thyroid disease     Thyroid disease     Thyroid  disease     Uncomplicated asthma        PAST SURGICAL HISTORY:     Past Surgical History:   Procedure Laterality Date    APPENDECTOMY  1936    APPENDECTOMY      CARDIAC SURGERY  1999    Left fem pop    CARDIAC SURGERY      ENT SURGERY  1933    Tonsillectomy    HC VASCULAR SURGERY PROCEDURE UNLIST      IR ABDOMINAL AORTOGRAM  2/20/2001    IR EXTREMITY ANGIOGRAM BILATERAL  2/20/2001    IR LUMBAR EPIDURAL STEROID INJECTION  9/8/2017    IR MISCELLANEOUS PROCEDURE  2/20/2001    IR MISCELLANEOUS PROCEDURE  2/20/2001    IR MISCELLANEOUS PROCEDURE  2/21/2001    IR MISCELLANEOUS PROCEDURE  2/21/2001    IR MISCELLANEOUS PROCEDURE  2/22/2001    IR MISCELLANEOUS PROCEDURE  2/22/2001    IR MISCELLANEOUS PROCEDURE  2/22/2001    IR MISCELLANEOUS PROCEDURE  2/22/2001    IR MISCELLANEOUS PROCEDURE  2/22/2001    IR MISCELLANEOUS PROCEDURE  2/23/2001    IR MISCELLANEOUS PROCEDURE  2/23/2001    IR MISCELLANEOUS PROCEDURE  6/19/2001    IR MISCELLANEOUS PROCEDURE  6/19/2001    IR MISCELLANEOUS PROCEDURE  6/20/2001    KNEE SURGERY      NECK SURGERY      ORTHOPEDIC SURGERY      right knee work in 1950, followed by replacement 2006    REPLACEMENT TOTAL KNEE Right     TONSILLECTOMY      ZZC STOMACH SURGERY PROCEDURE UNLISTED           CURRENT MEDICATIONS:   acetaminophen (TYLENOL) 500 MG tablet  allopurinol (ZYLOPRIM) 100 MG tablet  amLODIPine (NORVASC) 10 MG tablet  aspirin 81 MG tablet  Cholecalciferol (VITAMIN D3) 1.25 MG (70343 UT) TABS  metoprolol tartrate (LOPRESSOR) 25 MG tablet  pantoprazole (PROTONIX) 40 MG EC tablet  rivastigmine (EXELON) 4.6 MG/24HR 24 hr patch  senna-docusate (SENOKOT-S/PERICOLACE) 8.6-50 MG tablet  sucralfate (CARAFATE) 1 GM tablet  warfarin ANTICOAGULANT (COUMADIN) 2 MG tablet  mirtazapine (REMERON) 15 MG tablet         ALLERGIES:     Allergies   Allergen Reactions    Ace Inhibitors     Penicillins        FAMILY HISTORY:     Family History   Problem Relation Age of Onset    Cancer Daughter 43        pancreatic     Cancer Daughter     Diabetes Mother     Hyperlipidemia Mother     Hypertension Brother     Coronary Artery Disease Brother        SOCIAL HISTORY:     Social History     Socioeconomic History    Marital status:    Tobacco Use    Smoking status: Former     Packs/day: 1.00     Years: 40.00     Additional pack years: 0.00     Total pack years: 40.00     Types: Cigarettes   Substance and Sexual Activity    Alcohol use: No    Drug use: No    Sexual activity: Not Currently       VITALS:   BP 92/53   Pulse 54   Temp 99.2  F (37.3  C) (Oral)   Resp 14   SpO2 96%     PHYSICAL EXAM     Physical Exam  Vitals and nursing note reviewed. Exam conducted with a chaperone present.   Constitutional:       Appearance: He is ill-appearing.         Physical Exam   Constitutional: Early male appears ill nontoxic.    Head: Atraumatic.     Nose: Nose normal.     Mouth/Throat: Oropharynx is clear dry mucous membranes    Eyes: EOM are normal. Pupils are equal, round, and reactive to light.  Injection of the left eye.    Ears: Bilateral pearly white tympanic membranes.    Neck: Normal range of motion. Neck supple.     Cardiovascular: Normal rate, regular rhythm and normal heart sounds.      Pulmonary/Chest: Normal effort  and breath sounds normal.     Abdominal: soft nontender.    Musculoskeletal: Normal range of motion.     Neurological: Moves upper and lower extremities equally.  Oriented to self recognizes son but does not know the year or month.    Lymphatics: no edema    : Wearing adult diaper.  Skin: Skin is warm and dry.  No petechia no purpura    Psychiatric: Normal mood and affect. Behavior is normal.       LAB:     All pertinent labs reviewed and interpreted.  Labs Ordered and Resulted from Time of ED Arrival to Time of ED Departure   BASIC METABOLIC PANEL - Abnormal       Result Value    Sodium 144      Potassium 4.0      Chloride 107      Carbon Dioxide (CO2) 26      Anion Gap 11      Urea Nitrogen 24.2 (*)      Creatinine 2.83 (*)     GFR Estimate 20 (*)     Calcium 9.4      Glucose 110 (*)    BLOOD GAS VENOUS - Abnormal    pH Venous 7.39      pCO2 Venous 43      pO2 Venous 23 (*)     Bicarbonate Venous 26      Base Excess/Deficit 1.0      Oxyhemoglobin Venous 37.6 (*)     O2 Sat, Venous 38.2 (*)    ROUTINE UA WITH MICROSCOPIC REFLEX TO CULTURE - Abnormal    Color Urine Light Yellow      Appearance Urine Clear      Glucose Urine 100 (*)     Bilirubin Urine Negative      Ketones Urine Negative      Specific Gravity Urine 1.016      Blood Urine 0.03 mg/dL (*)     pH Urine 6.5      Protein Albumin Urine 50 (*)     Urobilinogen Urine <2.0      Nitrite Urine Negative      Leukocyte Esterase Urine Negative      Mucus Urine Present (*)     RBC Urine <1      WBC Urine 1     INFLUENZA A/B, RSV, & SARS-COV2 PCR - Abnormal    Influenza A PCR Negative      Influenza B PCR Negative      RSV PCR Negative      SARS CoV2 PCR Positive (*)    CBC WITH PLATELETS AND DIFFERENTIAL - Abnormal    WBC Count 6.4      RBC Count 4.08 (*)     Hemoglobin 11.9 (*)     Hematocrit 37.2 (*)     MCV 91      MCH 29.2      MCHC 32.0      RDW 16.0 (*)     Platelet Count 134 (*)     % Neutrophils 65      % Lymphocytes 17      % Monocytes 16      % Eosinophils 0      % Basophils 1      % Immature Granulocytes 1      NRBCs per 100 WBC 0      Absolute Neutrophils 4.2      Absolute Lymphocytes 1.1      Absolute Monocytes 1.0      Absolute Eosinophils 0.0      Absolute Basophils 0.0      Absolute Immature Granulocytes 0.1      Absolute NRBCs 0.0     INR - Abnormal    INR 1.79 (*)    HEPATIC FUNCTION PANEL - Normal    Protein Total 6.9      Albumin 4.2      Bilirubin Total 0.3      Alkaline Phosphatase 108      AST 18      ALT 9      Bilirubin Direct <0.20     LIPASE - Normal    Lipase 21     LACTIC ACID WHOLE BLOOD - Normal    Lactic Acid 0.9     BLOOD CULTURE   BLOOD CULTURE        RADIOLOGY:     Reviewed all pertinent imaging. Please see official radiology  report.  XR Chest Port 1 View   Final Result   IMPRESSION: Since 03/14/2022, increased diffuse interstitial prominence which could be seen with viral pneumonia. Prominent skin fold overlying the right upper lobe laterally. Remainder not significantly changed. Aortic calcification. Cardiomediastinal    silhouette otherwise within normal limits. No definite pleural effusion.      Head CT w/o contrast    (Results Pending)        EKG:     EKG #1  Normal sinus rhythm normal anterior progression normal axis    Time:299527    Ventricular rate 65 bmp  Axis normal  HI interval 170 ms  QRS duration 94 ms  QT//432 ms    Compared to previous EKG on 19 2021 heart rate was 85 then.  I have independently reviewed and interpreted the EKG(s) documented above.      PROCEDURES:     Procedures      I, Hafsa Fajardo, am serving as a scribe to document services personally performed by Dr. Kessler based on my observation and the provider's statements to me. I, Betsey Kessler MD attest that Hafsa Fajardo is acting in a scribe capacity, has observed my performance of the services and has documented them in accordance with my direction.    Betsey Kessler M.D.  Emergency Medicine  [facility] EMERGENCY DEPARTMENT  [facility] [facility] [id number] [phone]  Dept: [phone]       Betsey Kessler MD  11/24/23 1402

## 2023-11-25 NOTE — PROGRESS NOTES
"Occupational Therapy     11/25/23 9344   Appointment Info   Signing Clinician's Name / Credentials (OT) Sandhya Burden OTR/L   Living Environment   People in Home spouse   Current Living Arrangements condominium   Home Accessibility no concerns  (elevator)   Living Environment Comments walk in shower w/chair; B grab bars @ the toilet.   Self-Care   Usual Activity Tolerance moderate   Current Activity Tolerance fair   Equipment Currently Used at Home other (see comments)  (FWW-uses all the time per report)   Activity/Exercise/Self-Care Comment Son reports patient and patient's spouse have 24/7 supervision and assist provided by their 5 children and 2 paid caregivers. Son reports patient uses FWW & min A for  dressing and shower transfers. Family assist with all IADLs.   General Information   Onset of Illness/Injury or Date of Surgery 11/24/23   Referring Physician Juan M Cox MD   Patient/Family Therapy Goal Statement (OT) son expressed DC'ing directly home w/family A   Existing Precautions/Restrictions fall   General Observations and Info Per chart:  \"89 year old male with a past medical history of DVT, GERD, hypertension, dementia.\"   Cognitive Status Examination   Orientation Status person   Follows Commands follows one-step commands;repetition of directions required;physical/tactile prompts required;initiation impaired;delayed response/completion;increased processing time needed   Safety Deficit insight into deficits/self-awareness   Attention Deficit requires cues/redirection to task   Cognitive Status Comments Unclear cognitive baseline. Son present & expressed no concern; pt needing constant cues & redirection to simple cues. Needing 24 hr supervision/A.   Pain Assessment   Patient Currently in Pain No   Posture   Posture forward head position  (flexed posture @ times)   Range of Motion Comprehensive   Comment, General Range of Motion slightly decr @ B shldr flex, but appears WFL for basic ADLs. "   Strength Comprehensive (MMT)   Comment, General Manual Muscle Testing (MMT) Assessment generalized weakness throughout; fatigues w/activity.   Bed Mobility   Comment (Bed Mobility) mod A sup to sit-some confusion with ability to follow cues for tech.   Transfers   Transfer Comments min A of 2 STS from elevated bed ht.   Balance   Balance Comments Min A w/FWW for functional mob; slightly unsteady w/o LOB.   Activities of Daily Living   BADL Assessment/Intervention lower body dressing   Lower Body Dressing Assessment/Training   Comment, (Lower Body Dressing) dependent w/socks   Clinical Impression   Criteria for Skilled Therapeutic Interventions Met (OT) Yes, treatment indicated   OT Diagnosis decreased ADLs   OT Problem List-Impairments impacting ADL activity tolerance impaired;balance;cognition;mobility;strength   Assessment of Occupational Performance 3-5 Performance Deficits   Identified Performance Deficits decreased activity tolerance, decreased balance, bed mob, trfs, LB drsg, impaired cognition   Planned Therapy Interventions (OT) ADL retraining;bed mobility training;cognition;strengthening;transfer training;progressive activity/exercise;home program guidelines   Clinical Decision Making Complexity (OT) detailed assessment/moderate complexity   Risk & Benefits of therapy have been explained evaluation/treatment results reviewed;care plan/treatment goals reviewed;patient;son   Clinical Impression Comments It appears at baseline family A both pt & his spouse 24/7. Pt is functioning below his PLOF & currently requiring A of 1 hands on assist for ADLs/standing/trfs & increased supervision. Pt would perform better in familar enviornment & family. Son stated no concerns w/managing once home.   OT Total Evaluation Time   OT Eval, Moderate Complexity Minutes (62021) 12   OT Goals   Therapy Frequency (OT) Daily   OT Predicted Duration/Target Date for Goal Attainment 12/02/23   OT Goals Toilet  Transfer/Toileting;Transfers;OT Goal 1   OT: Transfer   (CGA)   OT: Toilet Transfer/Toileting   (CGA)   OT: Goal 1 Pt to demo > 6 minutes standing tolerance w/CGA & AD in prep ADLs.   Self-Care/Home Management   Self-Care/Home Mgmt/ADL, Compensatory, Meal Prep Minutes (45560) 10  (co-tx w/PT)   Symptoms Noted During/After Treatment (Meal Preparation/Planning Training) fatigue   Treatment Detail/Skilled Intervention Additional room trfs requiring min A of 2. Pt unable to follow cues both verbal, tactile or visual to practice toilet trf. Pt presents w/significant cognitive deficit w/difficulty attending to directions & difficulty w/processing & following cues. Pt needing tactile cues & assist for using armrests w/every trf. to maximze safety. Dependent w/managing adult brief but did have some awareness for the need of assistance. Son present during session & expressed mult times no concerns w/managing & family's ability to provide current assistance. Reinforced 24/7 A of 1 & hands on assist during ADLs/standing & trfs. Pt needing constant assist & cues for safely manuvering FWW & navigating around objects in order to avoid them. Pt demo SOB w/activity w/O2 on RA remaining > 92%. At end of session, pt seated in recliner w/son planning to stay & assisting w/feeding. Nrsg notified no current chair alarm avail. in room. Call light in reach.   OT Discharge Planning   OT Plan trfs, standing etienne., light GH, monitor cogn   OT Discharge Recommendation (DC Rec) home with assist  (recommend 24 hr A/supervision; son stated they plan to take him home & can provide this level of A.)   OT Rationale for DC Rec Pt requiring 24 hr A/supervision of 1 for all ADLs & trfs. Rec A for all IADLs. Family stated they can provide this level of care & plan to take pt home.   OT Brief overview of current status min A of 2 STS, min A amb w/FWW, impaired cogn-24 hr A needed.   Total Session Time   Timed Code Treatment Minutes 10   Total Session  Time (sum of timed and untimed services) 22

## 2023-11-25 NOTE — PROGRESS NOTES
Family at bedside. Pt has minor cough. Denies pain. VS stable. Family is staying the night with approval from ANS. Pt is in no distress at this time. Resps even and unlabored. Sacral mepilex in place.

## 2023-11-25 NOTE — PROGRESS NOTES
[facility]    Medicine Progress Note - Hospitalist Service    Date of Admission:  11/24/2023    Assessment & Plan   89 year old male with a past medical history of DVT, GERD, hypertension, dementia who presents to the ER by EMS for evaluation of a fever.  Admitted with COVID and worsening confusion.  Started on prophylactic remdesivir and also on IV fluids for NIEVES and CKD.    PT/OT consulted for evaluation given weakness.    -Await for clinical improvement.     Acute COVID infection  Positive exposure to his wife tested COVID few days ago and patient presented with fever 101.9. No hypoxia.  Chest x-ray shows increased interstitial prominence goes with viral pneumonia.  -Clinically stable, borderline febrile  -IV remdesivir (start date 11/24)  -Anticoagulation with warfarin  -Hold off steroid as patient is not hypoxic     Acute encephalopathy  Toxic, secondary to above.  Baseline dementia. Patient had previous condition post hip surgery 2 years ago. CT head is negative for acute changes  -Continue supportive care  -Restart home Exelon patch  -Family will stay with patient overnight to help with behavior     Acute kidney injury  Baseline chronic kidney disease stage III-IV  -Gentle IV hydration  -Avoid nephrotoxic drugs  -Continue to monitor renal function     Hypertension  -Mild elevation of blood pressure on admission   -Restart home medications  -Continue to monitor blood pressure     Weakness and deconditioning  -Secondary to the above  -PT/OT evaluate          Diet: Mechanical/Dental Soft Diet    DVT Prophylaxis: Warfarin  Black Catheter: Not present  Lines: None     Cardiac Monitoring: None  Code Status: No CPR- Do NOT Intubate      Clinically Significant Risk Factors                # Thrombocytopenia: Lowest platelets = 114 in last 2 days, will monitor for bleeding   # Hypertension: Noted on problem list        # Overweight: Estimated body mass index is 25.01 kg/m  as calculated from  "the following:    Height as of this encounter: 1.676 m (5' 6\").    Weight as of this encounter: 70.3 kg (154 lb 15.7 oz)., PRESENT ON ADMISSION     # Asthma: noted on problem list        Disposition Plan      Expected Discharge Date: 11/26/2023    Discharge Delays: OT Disposition recs needed  PT Disposition recs needed                Miles Singh MD  Hospitalist Service  [facility]  Securely message with Subtextual (more info)  Text page via [employer] Paging/Directory   ______________________________________________________________________    Interval History   No acute events overnight.     Patient seen and evaluated at bedside. Son present at bedside.  Patient reports feeling relatively well.  Denies any chest pain, shortness breath or abdominal pain.    Patient's son reports that patient is relatively functional at baseline, 2 days ago was moving around by himself relatively well, uses a walker.  Was able to get up from a seated position without any assistance.    Physical Exam   Vital Signs: Temp: 98.2  F (36.8  C) Temp src: Oral BP: (!) 185/82 Pulse: 82   Resp: 18 SpO2: 94 % O2 Device: None (Room air)    Weight: 154 lbs 15.73 oz    General: NAD, lying in bed  CV: +S1/S2, no pitting edema  Respiratory: clear to auscultation bilaterally, no wheezing  GI: soft, nontender, nondistended  Neuro: alert    Medical Decision Making       50 MINUTES SPENT BY ME on the date of service doing chart review, history, exam, documentation & further activities per the note.      Data     I have personally reviewed the following data over the past 24 hrs:    7.0  \   10.9 (L)   / 114 (L)     140 106 24.4 (H) /  94   3.7 20 (L) 2.60 (H) \     ALT: 10 AST: 23 AP: 89 TBILI: 0.2   ALB: 3.5 TOT PROTEIN: 5.8 (L) LIPASE: N/A     Procal: N/A CRP: 56.90 (H) Lactic Acid: 0.9       INR:  1.67 (H) PTT:  N/A   D-dimer:  2.61 (H) Fibrinogen:  N/A       Imaging results reviewed over the past 24 hrs:   No results found for " this or any previous visit (from the past 24 hour(s)).

## 2023-11-25 NOTE — PLAN OF CARE
Problem: Adult Inpatient Plan of Care  Goal: Optimal Comfort and Wellbeing  11/25/2023 0812 by Jasiel Cabral RN  Outcome: Progressing  11/25/2023 0811 by Jasiel Cabral RN  Outcome: Progressing     Problem: Risk for Delirium  Goal: Improved Sleep  11/25/2023 0812 by Jasiel Cabral RN  Outcome: Progressing  11/25/2023 0811 by Jasiel Cabral RN  Outcome: Progressing     Problem: Infection  Goal: Absence of Infection Signs and Symptoms  Outcome: Progressing   Goal Outcome Evaluation:  Alert&disoriented to time and situation. One assist with a walker. Pt slept well through the night. Difficult to wake in the middle of the night, family states that he is normally a heavy sleeper. VSS, low grade temp. 99.8. Pts family requested we check pts temp. In the night. NS running at 50 mL/hr. PAINAD (0).

## 2023-11-25 NOTE — PLAN OF CARE
Problem: Risk for Delirium  Goal: Optimal Coping  Outcome: Progressing     Problem: Infection  Goal: Absence of Infection Signs and Symptoms  Outcome: Progressing   Goal Outcome Evaluation:       Pt resting comfortably, eyes open spontaneously. Resps even and unlabored. Denies pain. VSS, had episodic hypertension earlier that resolved without intervention. Likely due to having a coughing fit. Son at bedside, pleasant and helpful with pt. LR @ 75/hr.

## 2023-11-25 NOTE — PLAN OF CARE
Goal Outcome Evaluation:       Hany has been sleepy this shift. He needed cues with taking his medications. Primofit applied, Per his family he often feels cold, he likes the room very warm.   Occasional non-productive cough.

## 2023-11-25 NOTE — PROGRESS NOTES
"   11/25/23 1812   Appointment Info   Signing Clinician's Name / Credentials (PT) Travis Alvarado, PT, DPT   Rehab Comments (PT) Patient left sitting up in the bedside recliner with son present, assisting the patient with eating his meal, needs within reach.   Living Environment   People in Home spouse   Current Living Arrangements condominium   Home Accessibility no concerns   Self-Care   Equipment Currently Used at Home walker, rolling;grab bar, tub/shower;shower chair  (FWW)   Fall history within last six months no   Activity/Exercise/Self-Care Comment Son reports patient and patient's spouse have 24/7 supervision and assist provided by their 5 children and 2 paid caregivers. Son reports patient is typically able to mobilize with FWW with supervision but requires assist with dressing and shower transfers. Family assist with all IADL. Patient's spouse has memory deficits and is a high fall risk as well. Per son, patient's cognition is \"sharp as a tac\"   General Information   Onset of Illness/Injury or Date of Surgery 11/24/23   Referring Physician Juan M Cox MD   Patient/Family Therapy Goals Statement (PT) home tomorrow   Pertinent History of Current Problem (include personal factors and/or comorbidities that impact the POC) COVID and worsening confusion   Existing Precautions/Restrictions fall  (impaired cognition)   Range of Motion (ROM)   Range of Motion ROM deficits secondary to weakness   Strength (Manual Muscle Testing)   Strength (Manual Muscle Testing) Deficits observed during functional mobility   Bed Mobility   Bed Mobility supine-sit   Supine-Sit [place] (Bed Mobility) moderate assist (50% patient effort);verbal cues;nonverbal cues (demo/gesture)  (modA+Bijan)   Assistive Device (Bed Mobility) bed rails  (HOB elevated)   Transfers   Transfers sit-stand transfer   Sit-Stand Transfer   Sit-Stand [place] (Transfers) moderate assist (50% patient effort);2 person assist;verbal cues;nonverbal cues " (demo/gesture)   Assistive Device (Sit-Stand Transfers) walker, front-wheeled   Gait/Stairs (Locomotion)   [place] Level (Gait) minimum assist (75% patient effort);1 person to manage equipment;verbal cues;nonverbal cues (demo/gesture)   Assistive Device (Gait) walker, front-wheeled   Distance in Feet (Gait) 10ft in room   Clinical Impression   Criteria for Skilled Therapeutic Intervention Yes, treatment indicated   PT Diagnosis (PT) impaired functional mobility   Influenced by the following impairments impaired balance, impaired strength, impaired cognition, impaired activity tolerance   Functional limitations due to impairments imparied bed mobility, impaired transfers, impaired gait   Clinical Presentation (PT Evaluation Complexity) stable   Clinical Presentation Rationale clinical judgement   Clinical Decision Making (Complexity) low complexity   Planned Therapy Interventions (PT) balance training;bed mobility training;gait training;home exercise program;neuromuscular re-education;patient/family education;strengthening;transfer training;progressive activity/exercise   Risk & Benefits of therapy have been explained evaluation/treatment results reviewed;care plan/treatment goals reviewed;participants voiced agreement with care plan;participants included;patient;son   PT Total Evaluation Time   PT Eval, Low Complexity Minutes (89226) 10   Physical Therapy Goals   PT Frequency Daily   PT Predicted Duration/Target Date for Goal Attainment 12/02/23   PT Goals Bed Mobility;Transfers;Gait   PT: Bed Mobility Minimal assist;Supine to/from sit   PT: Transfers Minimal assist;Sit to/from stand;Bed to/from chair;Assistive device   PT: Gait Minimal assist;Rolling walker;50 feet  (FWW)   Interventions   Interventions Quick Adds Therapeutic Activity;Gait Training   Therapeutic Activity   Therapeutic Activities: dynamic activities to improve functional performance Minutes (30798) 8   Treatment Detail/Skilled Intervention  Co-treat with OT: Patient able to perform multiple transfers during the session, required minAx2 > modAx2 with FWW with max cues and hand over hand for hand placement. Patient presenting with impaired ability to follow verbal or tactile cues/facilitation for mobility. Patient requiring increased time to attempt to follow directions. Discussed with patient's son that patient does present with current cognitive difficulties as he was unable to follow cues for hand placement when trying to sit on the recliner, and with difficulty in the bathroom.   Gait Training   Gait Training Minutes (30826) 8   Symptoms Noted During/After Treatment (Gait Training) fatigue;shortness of breath   Treatment Detail/Skilled Intervention Patient requiring increased assist for FWW management due to obstacles, max cues for improved posture and task. Patient with significant difficulty in the bathroom when attempting to turn to approach the toilet, unable to follow verbal cues/directions, assist with FWW placement, or tactile faciliation of trunk rotation to properly  front of the toilet. Patient ambulates slowly with FWW pushed far in front of him, forward flexed posture, difficulty clearing his feet, and increased fatigue as he continued standing.   Distance in Feet additional ~10ft   [place] Level (Gait Training) minimum assist (75% patient effort)   Physical Assistance Level (Gait Training) verbal cues;nonverbal cues (demo/gestures);1 person + 1 person to manage equipment   Assistive Device (Gait Training) rolling walker  (FWW)   PT Discharge Planning   PT Plan progress OOB mobility with FWW, functional cognition, transfers, bed mobility   PT Discharge Recommendation (DC Rec) home with assist;home with home care physical therapy  (must have 24/7 supervision and assist)   PT Rationale for DC Rec Patient required second person for mobility today due to IV pole, lines, and unfamiliar setting for the patient. Patient currently  requires hands on assist for all mobility. Patient's son reports patient and his spouse have 24/7 supervision and assist and that they are hopeful the patient can dc home tomorrow. Patient will benefit from home PT to progress mobility and independence.   PT Brief overview of current status modAx1+1 supine>sit, min>modAx2 transfers FWW, amb minA1+1 FWW 20ft in room, impaired cognition   Total Session Time   Timed Code Treatment Minutes 16   Total Session Time (sum of timed and untimed services) 26

## 2023-11-25 NOTE — UTILIZATION REVIEW
Inpatient appropriate    Admission Status; Secondary Review Determination       Under the authority of the Utilization Management Committee, the utilization review process indicated a secondary review on the above patient. The review outcome is based on review of the medical records, discussions with staff, and applying clinical experience noted on the date of the review.     (x) Inpatient Status Appropriate - This patient's medical care is consistent with medical management for inpatient care and reasonable inpatient medical practice.     RATIONALE FOR DETERMINATION   89 year old male with a past medical history of DVT, GERD, hypertension, dementia who presents to the ER by EMS for evaluation of a fever and worsening confusion.  Pt found to have positive Covid19.  Due to worsening confusion and high risk for deterioration, patient started on remdesivir.  Patient will remain hospitalized and on remdesivir treatment for more than 48 hours.    At the time of admission with the information available to the attending physician more than 2 nights Hospital complex care was anticipated, based on patient risk of adverse outcome if treated as outpatient and complex care required. Inpatient admission is appropriate based on the Medicare guidelines.     The information on this document is developed by the utilization review team in order for the business office to ensure compliance. This only denotes the appropriateness of proper admission status and does not reflect the quality of care rendered.   The definitions of Inpatient Status and Observation Status used in making the determination above are those provided in the CMS Coverage Manual, Chapter 1 and Chapter 6, section 70.4.   Sincerely,   Sushil Zheng MD  Utilization Review  Physician Advisor  [facility].

## 2023-11-26 NOTE — PLAN OF CARE
Physical Therapy Discharge Summary    Reason for therapy discharge:    Discharged to home with home therapy.    Progress towards therapy goal(s). See goals on Care Plan in [facility] electronic health record for goal details.  Goals partially met.  Barriers to achieving goals:   discharge from facility.    Therapy recommendation(s):    Continued therapy is recommended.  Rationale/Recommendations:  further progression of independence with mobility.

## 2023-11-26 NOTE — PLAN OF CARE
Goal Outcome Evaluation:       Hany has been very sleepy today.He has been continent of urine using the urinal. Discharge orders reviewed with his son and Hany.

## 2023-11-26 NOTE — DISCHARGE SUMMARY
[facility]  Hospitalist Discharge Summary      Date of Admission:  11/24/2023  Date of Discharge:  11/26/2023  Discharging Provider: Miles Singh MD  Discharge Service: Hospitalist Service    Discharge Diagnoses   Acute COVID-19 infection     Follow-ups Needed After Discharge   Follow-up Appointments     Follow-up and recommended labs and tests       Follow up with primary care provider, YEIMY JOHN, within 7 days   for hospital follow- up.  The following labs/tests are recommended: CBC,   BMP.    Check INR in 2-3 days and follow up with who manages your Warfarin within   2-4 days of discharge.        Follow up culture results    Unresulted Labs Ordered in the Past 30 Days of this Admission       Date and Time Order Name Status Description    11/24/2023 12:06 PM Blood Culture Peripheral Blood Preliminary     11/24/2023 12:06 PM Blood Culture Peripheral Blood Preliminary         These results will be followed up by NA    Discharge Disposition   Discharged to home  Condition at discharge: Stable    Hospital Course   89 year old male with a past medical history of DVT, GERD, hypertension, dementia who presents to the ER by EMS for evaluation of a fever.  Admitted with COVID and worsening confusion.  Started on prophylactic remdesivir and also on IV fluids for NIEVES and CKD. Improved with IVF.    PT/OT consulted for evaluation given weakness. Patient was seen and cleared for discharge home with services.    -Await for clinical improvement.     Acute COVID-19 infection  Positive exposure to his wife tested COVID few days ago and patient presented with fever 101.9. No hypoxia.  Chest x-ray shows increased interstitial prominence goes with viral pneumonia.  -IV remdesivir (start date 11/24) prophylactically in house  -Anticoagulation with warfarin    Acute encephalopathy (Resolved)  Toxic, secondary to above.  Baseline dementia. Patient had previous condition post hip surgery 2 years ago. CT  head is negative for acute changes     NIEVES on CKD Stage 4 (Resolved)  Baseline chronic kidney disease stage IV. Cr baseline 2.5-2.7 in 2022.  -Improved to 2.5 with IVF; encourage to drink fluids after discharge     Hypertension  Mild elevation of blood pressure on admission      Weakness and deconditioning  -Secondary to the above  -PT/OT evaluated, cleared for discharge home with services    Consultations This Hospital Stay   CARE MANAGEMENT / SOCIAL WORK IP CONSULT  OCCUPATIONAL THERAPY ADULT IP CONSULT  PHYSICAL THERAPY ADULT IP CONSULT  PHARMACY TO DOSE WARFARIN    Code Status   No CPR- Do NOT Intubate    Time Spent on this Encounter   I, Miles Singh MD, personally saw the patient today and spent greater than 30 minutes discharging this patient.       Miles Singh MD  [address] [address]  Phone: [phone]  Fax: [phone]  ______________________________________________________________________    Physical Exam   Vital Signs: Temp: 98.8  F (37.1  C) Temp src: Axillary BP: (!) 144/67 Pulse: 64   Resp: 20 SpO2: 95 % O2 Device: None (Room air)    Weight: 154 lbs 15.73 oz    General: no apparent distress,  CV: unable to auscultate due to PAPR use, no peripheral edema  Resp: unable to auscultate due to PAPR use, no accessory muscle use  Abdomen: soft, non-tender, non-distended  Neuro: alert       Primary Care Physician   YEIMY JOHN    Discharge Orders      Home Care Referral      Follow-up and recommended labs and tests     Follow up with primary care provider, YEIMY JOHN, within 7 days for hospital follow- up.  The following labs/tests are recommended: CBC, BMP.    Check INR in 2-3 days and follow up with who manages your Warfarin within 2-4 days of discharge.     Activity    Your activity upon discharge: activity as tolerated     Reason for your hospital stay    COVID-19 infection and dehydration. We treated you with IV fluids  and also gave you a medication called Remdesivir which is an anti-viral medication. As I mentioned, continue to drink plenty of fluids and stay well hydrated when you get home. If you have a fever (greater than 100.4 F), you can take 500 mg (up to 1000 mg) of Tylenol to help with the fever. When people have a fever they can be a little off. You could take a dose of Ibuprofen (Advil) here and there if needed to help with the fever, 400-600 mg, but I would not take this more than once a day. Like I said make sure you stay well hydrated if you take Ibuprofen since it can affect the kidneys.    Additionally, I decreased the dose of your Warfarin slightly since your INR was slightly low, on day of discharge it was 1.83. The dose of Warfarin was increased from 2, to 2.5 mg once daily. Take a dose tonight. Given that you are on Warfarin, I stopped your Aspirin to decrease your bleeding risk given your age.     Diet    Follow this diet upon discharge:  Mechanical/Dental Soft Diet       Significant Results and Procedures   Most Recent 3 CBC's:  Recent Labs   Lab Test 11/26/23  0806 11/25/23 0727 11/24/23  1248   WBC 8.8 7.0 6.4   HGB 11.4* 10.9* 11.9*   MCV 91 92 91   * 114* 134*     Most Recent 3 BMP's:  Recent Labs   Lab Test 11/26/23  0806 11/25/23 0727 11/24/23  1248    140 144   POTASSIUM 3.7 3.7 4.0   CHLORIDE 108* 106 107   CO2 24 20* 26   BUN 22.9 24.4* 24.2*   CR 2.50* 2.60* 2.83*   ANIONGAP 10 14 11   MIKE 8.7 8.6 9.4   GLC 97 94 110*     Most Recent 2 LFT's:  Recent Labs   Lab Test 11/26/23 0806 11/25/23 0727   AST 42 23   ALT 13 10   ALKPHOS 84 89   BILITOTAL 0.2 0.2     Most Recent 3 INR's:  Recent Labs   Lab Test 11/26/23  0806 11/25/23 0727 11/24/23  1801   INR 1.83* 1.67* 1.69*     Most Recent INR's and Anticoagulation Dosing History:  Anticoagulation Dose History  More data exists         Latest Ref Rng & Units 7/10/2021 7/14/2021 7/19/2021 7/26/2021 11/24/2023 11/25/2023 11/26/2023   Recent  Dosing and Labs   warfarin ANTICOAGULANT (COUMADIN) tablet 2 mg - - - - - 2 mg, $Given - -   warfarin ANTICOAGULANT (COUMADIN) tablet 3 mg - - - - - - 3 mg, $Given -   INR 0.85 - 1.15 2.02  2.08        Effective 7/11/2021, the reference range for this assay has changed. 1.33        Effective 7/11/2021, the reference range for this assay has changed. 1.37        Effective 7/11/2021, the reference range for this assay has changed. 1.69  1.79  1.67  1.83    ,   Results for orders placed or performed during the hospital encounter of 11/24/23   XR Chest Port 1 View    Narrative    EXAM: XR CHEST PORT 1 VIEW  LOCATION: [facility]  DATE: 11/24/2023    INDICATION: fever  COMPARISON: 03/14/2022.      Impression    IMPRESSION: Since 03/14/2022, increased diffuse interstitial prominence which could be seen with viral pneumonia. Prominent skin fold overlying the right upper lobe laterally. Remainder not significantly changed. Aortic calcification. Cardiomediastinal   silhouette otherwise within normal limits. No definite pleural effusion.   Head CT w/o contrast    Narrative    EXAM: CT HEAD W/O CONTRAST  LOCATION: [facility]  DATE: 11/24/2023    INDICATION: CONFUSION  Anticoagulated.  Evaluate for bleed.  COMPARISON: Head CT 07/10/2021  TECHNIQUE: Routine CT Head without IV contrast. Multiplanar reformats. Dose reduction techniques were used.    FINDINGS:  INTRACRANIAL CONTENTS: No intracranial hemorrhage. Mild diffuse cerebral parenchymal volume loss. No midline shift. The basilar cisterns are patent. No cerebellar tonsillar ectopia. Moderate periventricular and scattered foci of deep white matter   hypodensities involving both cerebral hemispheres. No CT evidence for an acute infarct. Moderate area of encephalomalacia involving the anterior medial right frontal lobe.    VISUALIZED ORBITS/SINUSES/MASTOIDS: No intraorbital abnormality. Mild mucosal thickening of the ethmoid air  cells. No middle ear or mastoid effusion.    BONES/SOFT TISSUES: No acute abnormality.      Impression    IMPRESSION:  1.  No intracranial hemorrhage, mass lesions, hydrocephalus or CT evidence for an acute infarct.  2.  Mild diffuse cerebral parenchymal volume loss. Presumed chronic hypertensive/microvascular ischemic white matter changes.  3.  Moderate area of encephalomalacia involving the anterior medial right frontal lobe.       Discharge Medications   Current Discharge Medication List        CONTINUE these medications which have CHANGED    Details   acetaminophen (TYLENOL) 500 MG tablet Take 1 tablet (500 mg) by mouth every 4 hours as needed for pain Maximum 4000 mg per day. Can take up to 1000 mg per dose if needed    Associated Diagnoses: COVID-19 virus infection      warfarin ANTICOAGULANT (COUMADIN) 2.5 MG tablet Take 1 tablet (2.5 mg) by mouth daily  Qty: 30 tablet, Refills: 1    Associated Diagnoses: Anticoagulated on warfarin           CONTINUE these medications which have NOT CHANGED    Details   allopurinol (ZYLOPRIM) 100 MG tablet Take 1 tablet by mouth daily      amLODIPine (NORVASC) 10 MG tablet Take 1 tablet by mouth daily      Cyanocobalamin 5000 MCG CAPS Take 1 capsule by mouth daily      Melatonin 10 MG TABS tablet Take 10 mg by mouth at bedtime      memantine (NAMENDA) 10 MG tablet Take 10 mg by mouth 2 times daily      metoprolol tartrate (LOPRESSOR) 25 MG tablet Take 25 mg by mouth 2 times daily      mirtazapine (REMERON) 15 MG tablet Take 1 tablet by mouth at bedtime      NONFORMULARY Take 1 tablet by mouth daily Mini blood builder OTC, contains iron      pantoprazole (PROTONIX) 40 MG EC tablet Take 1 tablet by mouth every morning      rivastigmine (EXELON) 4.6 MG/24HR 24 hr patch Place 1 patch onto the skin daily For 12 hours. Put on in am and take off in pm      senna-docusate (SENOKOT-S/PERICOLACE) 8.6-50 MG tablet Take 2 tablets by mouth at bedtime      sucralfate (CARAFATE) 1 GM tablet  Take 1 g by mouth daily      Vitamin D3 (CHOLECALCIFEROL) 125 MCG (5000 UT) tablet Take 10,000 Units by mouth daily      zinc gluconate 50 MG tablet Take 50 mg by mouth daily           STOP taking these medications       aspirin 81 MG tablet Comments:   Reason for Stopping:             Allergies   Allergies   Allergen Reactions    Ace Inhibitors     Penicillins      Longstanding allergy, unknown reaction

## 2023-11-26 NOTE — PLAN OF CARE
Problem: Adult Inpatient Plan of Care  Goal: Optimal Comfort and Wellbeing  Outcome: Progressing  Intervention: Provide Person-Centered Care  Trust Relationship/Rapport:   care explained   questions answered  -Family at bedside  Problem: Risk for Delirium  Goal: Improved Behavioral Control  Outcome: Progressing   Pt pleasantly confused, resting well. Was slightly restless at beginning of shift but slept well as the night progressed. Family remains remains at bedside.  Problem: Infection  Goal: Absence of Infection Signs and Symptoms  Outcome: Progressing  No fevers, VSS on RA,    Goal Outcome Evaluation:       Pt slept well most of the night, no acute events reported.

## 2023-11-26 NOTE — PROGRESS NOTES
Care Management Discharge Note    Discharge Date: 11/26/2023       Discharge Disposition: Home with home care     Discharge Services:  PT/OT/RN home care with [facility]    Discharge DME:  none    Discharge Transportation:  family     Private pay costs discussed: Not applicable    Does the patient's insurance plan have a 3 day qualifying hospital stay waiver?  No      Education Provided on the Discharge Plan:  yes  Persons Notified of Discharge Plans: Patient, Patient's family, nursing, hospitalist   Patient/Family in Agreement with the Plan:  yes    Handoff Referral Completed: Yes    Additional Information:  1:56PM   placed referral to [facility] hub for home care PT/OT/RN.     Patient is discharging home with home care. Homecare service provider still pending. Family to transport.     2:21 PM   [facility] has accepted patient for home care PT/OT/RN.     NATHAN Garcia

## 2023-11-27 NOTE — PLAN OF CARE
Occupational Therapy Discharge Summary    Reason for therapy discharge:    Discharged to home with home therapy.    Progress towards therapy goal(s). See goals on Care Plan in [facility] electronic health record for goal details.  Goals partially met.  Barriers to achieving goals:   discharge from facility.    Therapy recommendation(s):    Continued therapy is recommended.  Rationale/Recommendations:  recommend home care therapy and 24/7 assistance.    Smitha Fernandez, OTR/L 11/27/23

## 2024-01-01 ENCOUNTER — DOCUMENTATION ONLY (OUTPATIENT)
Dept: OTHER | Facility: CLINIC | Age: 89
End: 2024-01-01
Payer: MEDICARE